# Patient Record
Sex: MALE | Race: WHITE | NOT HISPANIC OR LATINO | Employment: OTHER | URBAN - METROPOLITAN AREA
[De-identification: names, ages, dates, MRNs, and addresses within clinical notes are randomized per-mention and may not be internally consistent; named-entity substitution may affect disease eponyms.]

---

## 2020-10-06 ENCOUNTER — APPOINTMENT (OUTPATIENT)
Dept: PREADMISSION TESTING | Facility: HOSPITAL | Age: 65
End: 2020-10-06
Attending: ORTHOPAEDIC SURGERY
Payer: COMMERCIAL

## 2020-10-06 ENCOUNTER — HOSPITAL ENCOUNTER (OUTPATIENT)
Dept: CARDIOLOGY | Facility: HOSPITAL | Age: 65
Discharge: HOME | End: 2020-10-06
Attending: ORTHOPAEDIC SURGERY
Payer: COMMERCIAL

## 2020-10-06 ENCOUNTER — TRANSCRIBE ORDERS (OUTPATIENT)
Dept: CARDIOLOGY | Facility: HOSPITAL | Age: 65
End: 2020-10-06

## 2020-10-06 VITALS
HEIGHT: 69 IN | WEIGHT: 150.9 LBS | HEART RATE: 72 BPM | BODY MASS INDEX: 22.35 KG/M2 | OXYGEN SATURATION: 100 % | SYSTOLIC BLOOD PRESSURE: 130 MMHG | TEMPERATURE: 98.2 F | DIASTOLIC BLOOD PRESSURE: 74 MMHG | RESPIRATION RATE: 18 BRPM

## 2020-10-06 DIAGNOSIS — M17.12 UNILATERAL PRIMARY OSTEOARTHRITIS, LEFT KNEE: ICD-10-CM

## 2020-10-06 DIAGNOSIS — Z01.818 ENCOUNTER FOR OTHER PREPROCEDURAL EXAMINATION: ICD-10-CM

## 2020-10-06 DIAGNOSIS — M17.12 UNILATERAL PRIMARY OSTEOARTHRITIS, LEFT KNEE: Primary | ICD-10-CM

## 2020-10-06 PROBLEM — M35.3 PMR (POLYMYALGIA RHEUMATICA) (CMS/HCC): Status: ACTIVE | Noted: 2020-10-06

## 2020-10-06 PROBLEM — I82.409 DVT (DEEP VENOUS THROMBOSIS) (CMS/HCC): Status: ACTIVE | Noted: 2020-10-06

## 2020-10-06 PROBLEM — N18.9 CKD (CHRONIC KIDNEY DISEASE): Status: ACTIVE | Noted: 2020-10-06

## 2020-10-06 PROBLEM — Q61.2 POLYCYSTIC KIDNEY, ADULT TYPE: Status: ACTIVE | Noted: 2020-10-06

## 2020-10-06 PROBLEM — E78.5 HLD (HYPERLIPIDEMIA): Status: ACTIVE | Noted: 2020-10-06

## 2020-10-06 PROBLEM — D75.829 HIT (HEPARIN-INDUCED THROMBOCYTOPENIA) (CMS/HCC): Status: ACTIVE | Noted: 2020-10-06

## 2020-10-06 PROBLEM — N25.81 SECONDARY HYPERPARATHYROIDISM OF RENAL ORIGIN (CMS/HCC): Status: ACTIVE | Noted: 2020-10-06

## 2020-10-06 PROBLEM — I10 ESSENTIAL HYPERTENSION: Status: ACTIVE | Noted: 2020-10-06

## 2020-10-06 LAB
ATRIAL RATE: 72
P AXIS: 61
PR INTERVAL: 152
QRS DURATION: 90
QT INTERVAL: 396
QTC CALCULATION(BAZETT): 433
R AXIS: -9
T WAVE AXIS: 42
VENTRICULAR RATE: 72

## 2020-10-06 PROCEDURE — 99214 OFFICE O/P EST MOD 30 MIN: CPT | Performed by: HOSPITALIST

## 2020-10-06 PROCEDURE — 93005 ELECTROCARDIOGRAM TRACING: CPT

## 2020-10-06 RX ORDER — PREDNISONE 5 MG/1
5 TABLET ORAL DAILY PRN
COMMUNITY

## 2020-10-06 SDOH — HEALTH STABILITY: MENTAL HEALTH: HOW OFTEN DO YOU HAVE A DRINK CONTAINING ALCOHOL?: NEVER

## 2020-10-06 ASSESSMENT — PAIN SCALES - GENERAL: PAINLEVEL: 2

## 2020-10-06 NOTE — ASSESSMENT & PLAN NOTE
Will hold both chlorthalidone and lisinopril the AM of surgery  Can resume post op as needed for BP control if renal function is stable  Monitor vitals closely and avoid drops in SBP given risk of progression to ESRD

## 2020-10-06 NOTE — H&P (VIEW-ONLY)
"   [facility] Medicine Service -  Pre-Operative Consultation       Patient Name: David Tapia  Referring Surgeon: Dr knight   Reason for Referral: Pre-Operative Evaluation  Surgical Procedure: total knee arthroplasty  Operative Date: 10/13/2020  Other Providers:      PCP: Rubin Durand MD          HISTORY OF PRESENT ILLNESS      David Tapia is a 65 y.o. male presenting today to the [facility] at [facility] for pre-operative evaluation prior to planned surgery.    Pt reports progressive pain in left knee over the last few years. He tore his ACL and his meniscus in the 1980s. The meniscus was repaired arthroscopically but the ACL was never repaired. He had improvement in pain for several years however over the last 5 years or more he has \"really gone downhill\". He has instability in the left knee and discomfort with activity. He does knee exercises to strengthen the joint which he feels has helped. He also takes tylenol and/or tramadol prn more severe pain.    In regards to medical history:  HTN, HLD, vit d deficiency, PCKD, CKD, hyperparathyroidism, HIT, PMR    The patient denies any current or recent chest pain or pressure, dyspnea, cough, sputum, fevers, chills, abdominal pain, nausea, vomiting, diarrhea or other symptoms.     Functionally, the patient is able to ascend a flight or so of stairs with no dyspnea or chest pain. 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 He routinely carries heavy objects up steps without issues other than  His orthopedic problems with his knee.    The patient denies, on specific questioning, the following:  No history of MI, arrhythmia,or CHF.  No history of PRADIP.  No history of COPD.  No history of CVA.  No history of DM.   Patient denies personal or family history of adverse reaction to anesthesia.    PAST MEDICAL AND SURGICAL HISTORY      Past Medical History:   Diagnosis Date   • Arthritis    • Chronic " kidney disease    • Deep vein thrombosis (CMS/HCC)     right leg   • History of transfusion    • Hypertension    • Lipid disorder    • Lyme disease    • Nocturia    • PE (pulmonary thromboembolism) (CMS/HCC) 2010    B/L lower lobes   • PMR (polymyalgia rheumatica) (CMS/[facility])     sees Dr. Jeronimo-last visit 1 1/2 yrs ago   • Polycystic kidney disease     sees Dr. Sandoval every 4 months   • Secondary hyperparathyroidism of renal origin (CMS/[facility])    • Shingles 2010    torso   • Vitamin D deficiency        Past Surgical History:   Procedure Laterality Date   • COLONOSCOPY     • HERNIA REPAIR Left     inguinal   • OTHER SURGICAL HISTORY      IVC filter   • WISDOM TOOTH EXTRACTION         MEDICATIONS        Current Outpatient Medications:   •  predniSONE (DELTASONE) 5 mg tablet, Take 5 mg by mouth daily as needed. Takes for PMR, Disp: , Rfl:   •  chlorthalidone (HYGROTEN) 25 mg tablet, Take 25 mg by mouth every morning.  , Disp: , Rfl:   •  cholecalciferol, vitamin D3, 50 mcg (2,000 unit) tablet, Take 2,000 Units by mouth every morning.  , Disp: , Rfl:   •  lisinopriL (PRINIVIL) 40 mg tablet, Take 40 mg by mouth every morning.  , Disp: , Rfl:   •  traMADoL (ULTRAM) 50 mg tablet, Take 50 mg by mouth 2 (two) times a day as needed for moderate pain., Disp: , Rfl:     ALLERGIES      Heparin    FAMILY HISTORY      family history includes Lung cancer in his biological father; Polycystic kidney disease in his biological mother.    Denies any prior known family history of DVTs/PEs/clotting disorder    SOCIAL HISTORY      Social History     Tobacco Use   • Smoking status: Former Smoker     Packs/day: 1.00     Years: 20.00     Pack years: 20.00     Types: Cigarettes     Quit date:      Years since quittin.7   • Smokeless tobacco: Never Used   Substance Use Topics   • Alcohol use: Never     Frequency: Never   • Drug use: Never       REVIEW OF SYSTEMS      All other systems reviewed and negative except as noted in  "HPI    PHYSICAL EXAMINATION      Visit Vitals  /74 (BP Location: Left upper arm, Patient Position: Sitting)   Pulse 72   Temp 36.8 °C (98.2 °F) (Temporal)   Resp 18   Ht 1.753 m (5' 9\")   Wt 68.4 kg (150 lb 14.4 oz)   SpO2 100% Comment: RA   BMI 22.28 kg/m²     Body mass index is 22.28 kg/m².    Physical Exam  Vitals signs and nursing note reviewed.   Constitutional:       General: He is not in acute distress.     Comments: thin   HENT:      Head: Normocephalic and atraumatic.   Neck:      Musculoskeletal: Neck supple.   Cardiovascular:      Rate and Rhythm: Normal rate.      Heart sounds: Normal heart sounds.   Pulmonary:      Effort: Pulmonary effort is normal. No respiratory distress.   Abdominal:      General: Bowel sounds are normal.      Palpations: Abdomen is soft.      Tenderness: There is no abdominal tenderness.   Skin:     General: Skin is warm and dry.      Findings: No rash.   Neurological:      Mental Status: He is alert.         LABS / EKG        Labs  outside labs reviewed from 10/2/2020. sig abnormal is creat 3.34    No results found for: NA, K, CL, BUN, CREATININE, WBC, HGB, HCT, PLT, ALT, AST, INR, HGBA1C      ECG/Telemetry  I have independently reviewed the ECG. No significant findings.    ASSESSMENT AND PLAN         CKD (chronic kidney disease)  BUN/Creat 67/3.34 which is near baseline for him (typically 3.1- 3.2)  K and bicarb normal  Pt was cleared by nephrology Dr Tho Sandoval  Would monitor renal function carefully in post operative period and avoid nephrotoxins/ drops in BP  Resume ace inhibitor if renal function stable post op (will be held AM of surgery)    Polycystic kidney, adult type  Stage GIV, A2 according to nephrology progress note from 7/14/2020  Nephrologist is Dr Tho Sandoval in [place]  Stable on regimen of Ace inhibitor and chlorthalidone (both will be held AM of surgery and can be resumed post op)  Kidney transplant and/or HD is on the horizon for him and pt is " aware of risk of progression of CKD with surgery    Note given PCKD pt is at risk for intracranial aneurysms, last MRA in 2008 showed normal [place] of [place], would recommend urgent evaluation if pt were to develop severe or atypical HA      Essential hypertension  Will hold both chlorthalidone and lisinopril the AM of surgery  Can resume post op as needed for BP control if renal function is stable  Monitor vitals closely and avoid drops in SBP given risk of progression to ESRD    HLD (hyperlipidemia)  Has preferred dietary changes to statin medication  Reassess lipids as outpatient    Secondary hyperparathyroidism of renal origin (CMS/[facility])  Continue vitamin D supps    Primary osteoarthritis of left knee  Scheduled for TKA next week    DVT (deep venous thrombosis) (CMS/[facility])  R superficial femoral DVT 6/12/2010 which on follow up US was seen extending into R common femoral vein  IVC filter was placed at that time due to intolerance of blood thinners (had bleeding into one of his renal cysts in June 2010 when DVT was discovered)  Unfortunately presented with PE in July 2010 - was started on argatroban and transitioned to coumadin. HIT testing was positive. Was treated with coumadin for 3-4 months and has been told to avoid heparin in the future.  Pt saw Dr Damico hematology on 10/1 for pre op testing and has follow up appt scheduled 10/9 to review test results (checking for Factor V leiden, prothrombin gene mutation, etc)  Would follow explicit instructions to be given by hematology (Dr Damico) regarding DVT prophylaxis after upcoming appointment.    HIT (heparin-induced thrombocytopenia) (CMS/[facility])  AVOID ALL HEPARIN AND LOVENOX EXPOSURE    PMR (polymyalgia rheumatica) (CMS/[facility])  Sees Dr Dugan rheumatology  Takes low dose prednisone (3-5 mg) prn flares - last use was July 2020 x 1 week  Also uses tramadol prn   Does not need stress dose steroids for upcoming surgery  Outpatient follow up       In regards to  perioperative cardiac risk:  The patient denies any history of ischemic heart disease, denies any history of CHF, denies any history of CVA, is not on pre-operative treatment with insulin, and does not have a pre-operative creatinine > 2 mg/dL.   The Revised Cardiac Risk Index (RCRI) for this patient indicates 0.9% risk.     Further comments: Please follow the explicit instructions to be given by hematology Dr Damico after upcoming appointment scheduled for 10/9/2020 regarding post op DVT prophylaxis in setting of HIT and prior VTE's.  Resume supplements when OK with surgical team.  I would encourage incentive spirometry to assist with minimizing tia-operative pulmonary risk.  DVT prophylaxis and timing of such per the discretion of the surgeon.     Please do not hesitate to contact [facility] during the upcoming hospitalization with any questions or concerns.     Aarti Pino, DO  10/6/2020

## 2020-10-06 NOTE — ASSESSMENT & PLAN NOTE
Sees Dr Dugan rheumatology  Takes low dose prednisone (3-5 mg) prn flares - last use was July 2020 x 1 week  Also uses tramadol prn   Does not need stress dose steroids for upcoming surgery  Outpatient follow up

## 2020-10-06 NOTE — PRE-PROCEDURE INSTRUCTIONS
1. Admissions will call you with your arrival time on 10/12/2020 (day prior to surgery) between 2pm - 4pm. For questions about your arrival time, please call [phone].    2. Please report to the [facility] in the [place] on the day of your procedure. Enter the hospital through the [facility] [place] [facility] (main entrance at [address], [place]). If you are parking in the [facility], come to the ground floor of the garage and follow signs to the [facility]. Bring your insurance card and photo ID.     3. Please follow these fasting guidelines:  - STOP all solid food 8 hours prior to arrival.   - No more than 12oz of water is permitted and must STOP 2 HOURS prior to arrival to the hospital.     4. Early on the morning of the procedure, please take the following medications listed below with a sip of water, in addition to any medications prescribed by your surgeon: Tramadol if needed    *NO aspirin, ibuprofen, anti-inflammatories, fish oil or Vitamin E unless ordered by physician.    *Stop taking      5. Other instructions: Hibiclens shower x 2(no face or genital area), brush teeth    6. If you develop a cough, cold, fever, or other symptom prior to the date of the procedure, please report it to your physician immediately.    7. If you need to cancel the procedure for any reason, please contact your physician.    8. Make arrangements to have someone drive you home from the procedure. If you have not arranged for transportation home, your surgery may be cancelled.     9. You may not take public transportation or or a cab unless accompanied by a responsible person.    10. You may not drive a car or operate complex or potentially dangerous machinery for 24 hours following anesthesia and/or sedation.    11. If it is medically necessary for you to have a longer stay, you will be informed as soon as the decision is made.    12. Do not wear or bring anything of value to the hospital,  including medication or jewelry of any kind. Do not wear make-up or contact lenses. Do bring your glasses and hearing aid, with a case. If you use a CPAP machine and will be overnight, please bring it with you. If you use any inhalers, please bring them as well.     13. Dress in comfortable clothes.    14. If instructed, please bring a copy of your Advance Directive (Living Will/Durable Power of ) on the day of your procedure.    Pre operative instructions given as per protocol.  Form explained by:

## 2020-10-06 NOTE — ASSESSMENT & PLAN NOTE
R superficial femoral DVT 6/12/2010 which on follow up US was seen extending into R common femoral vein  IVC filter was placed at that time due to intolerance of blood thinners (had bleeding into one of his renal cysts in June 2010 when DVT was discovered)  Unfortunately presented with PE in July 2010 - was started on argatroban and transitioned to coumadin. HIT testing was positive. Was treated with coumadin for 3-4 months and has been told to avoid heparin in the future.  Pt saw Dr Damico hematology on 10/1 for pre op testing and has follow up appt scheduled 10/9 to review test results (checking for Factor V leiden, prothrombin gene mutation, etc)  Would follow explicit instructions to be given by hematology (Dr Damico) regarding DVT prophylaxis after upcoming appointment.

## 2020-10-06 NOTE — ASSESSMENT & PLAN NOTE
BUN/Creat 67/3.34 which is near baseline for him (typically 3.1- 3.2)  K and bicarb normal  Pt was cleared by nephrology Dr Tho Sandoval  Would monitor renal function carefully in post operative period and avoid nephrotoxins/ drops in BP  Resume ace inhibitor if renal function stable post op (will be held AM of surgery)

## 2020-10-06 NOTE — ASSESSMENT & PLAN NOTE
Stage GIV, A2 according to nephrology progress note from 7/14/2020  Nephrologist is Dr Tho Sandoval in [place]  Stable on regimen of Ace inhibitor and chlorthalidone (both will be held AM of surgery and can be resumed post op)  Kidney transplant and/or HD is on the horizon for him and pt is aware of risk of progression of CKD with surgery    Note given PCKD pt is at risk for intracranial aneurysms, last MRA in 2008 showed normal [place] of [place], would recommend urgent evaluation if pt were to develop severe or atypical HA

## 2020-10-06 NOTE — CONSULTS
"   [facility] Medicine Service -  Pre-Operative Consultation       Patient Name: Dvaid Tapia  Referring Surgeon: Dr knight   Reason for Referral: Pre-Operative Evaluation  Surgical Procedure: total knee arthroplasty  Operative Date: 10/13/2020  Other Providers:      PCP: Rubin Durand MD          HISTORY OF PRESENT ILLNESS      David Tapia is a 65 y.o. male presenting today to the [facility] at [facility] for pre-operative evaluation prior to planned surgery.    Pt reports progressive pain in left knee over the last few years. He tore his ACL and his meniscus in the 1980s. The meniscus was repaired arthroscopically but the ACL was never repaired. He had improvement in pain for several years however over the last 5 years or more he has \"really gone downhill\". He has instability in the left knee and discomfort with activity. He does knee exercises to strengthen the joint which he feels has helped. He also takes tylenol and/or tramadol prn more severe pain.    In regards to medical history:  HTN, HLD, vit d deficiency, PCKD, CKD, hyperparathyroidism, HIT, PMR    The patient denies any current or recent chest pain or pressure, dyspnea, cough, sputum, fevers, chills, abdominal pain, nausea, vomiting, diarrhea or other symptoms.     Functionally, the patient is able to ascend a flight or so of stairs with no dyspnea or chest pain. 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 He routinely carries heavy objects up steps without issues other than  His orthopedic problems with his knee.    The patient denies, on specific questioning, the following:  No history of MI, arrhythmia,or CHF.  No history of PRADIP.  No history of COPD.  No history of CVA.  No history of DM.   Patient denies personal or family history of adverse reaction to anesthesia.    PAST MEDICAL AND SURGICAL HISTORY      Past Medical History:   Diagnosis Date   • Arthritis    • Chronic " kidney disease    • Deep vein thrombosis (CMS/HCC)     right leg   • History of transfusion    • Hypertension    • Lipid disorder    • Lyme disease    • Nocturia    • PE (pulmonary thromboembolism) (CMS/HCC) 2010    B/L lower lobes   • PMR (polymyalgia rheumatica) (CMS/[facility])     sees Dr. Jeronimo-last visit 1 1/2 yrs ago   • Polycystic kidney disease     sees Dr. Sandoval every 4 months   • Secondary hyperparathyroidism of renal origin (CMS/[facility])    • Shingles 2010    torso   • Vitamin D deficiency        Past Surgical History:   Procedure Laterality Date   • COLONOSCOPY     • HERNIA REPAIR Left     inguinal   • OTHER SURGICAL HISTORY      IVC filter   • WISDOM TOOTH EXTRACTION         MEDICATIONS        Current Outpatient Medications:   •  predniSONE (DELTASONE) 5 mg tablet, Take 5 mg by mouth daily as needed. Takes for PMR, Disp: , Rfl:   •  chlorthalidone (HYGROTEN) 25 mg tablet, Take 25 mg by mouth every morning.  , Disp: , Rfl:   •  cholecalciferol, vitamin D3, 50 mcg (2,000 unit) tablet, Take 2,000 Units by mouth every morning.  , Disp: , Rfl:   •  lisinopriL (PRINIVIL) 40 mg tablet, Take 40 mg by mouth every morning.  , Disp: , Rfl:   •  traMADoL (ULTRAM) 50 mg tablet, Take 50 mg by mouth 2 (two) times a day as needed for moderate pain., Disp: , Rfl:     ALLERGIES      Heparin    FAMILY HISTORY      family history includes Lung cancer in his biological father; Polycystic kidney disease in his biological mother.    Denies any prior known family history of DVTs/PEs/clotting disorder    SOCIAL HISTORY      Social History     Tobacco Use   • Smoking status: Former Smoker     Packs/day: 1.00     Years: 20.00     Pack years: 20.00     Types: Cigarettes     Quit date:      Years since quittin.7   • Smokeless tobacco: Never Used   Substance Use Topics   • Alcohol use: Never     Frequency: Never   • Drug use: Never       REVIEW OF SYSTEMS      All other systems reviewed and negative except as noted in  "HPI    PHYSICAL EXAMINATION      Visit Vitals  /74 (BP Location: Left upper arm, Patient Position: Sitting)   Pulse 72   Temp 36.8 °C (98.2 °F) (Temporal)   Resp 18   Ht 1.753 m (5' 9\")   Wt 68.4 kg (150 lb 14.4 oz)   SpO2 100% Comment: RA   BMI 22.28 kg/m²     Body mass index is 22.28 kg/m².    Physical Exam  Vitals signs and nursing note reviewed.   Constitutional:       General: He is not in acute distress.     Comments: thin   HENT:      Head: Normocephalic and atraumatic.   Neck:      Musculoskeletal: Neck supple.   Cardiovascular:      Rate and Rhythm: Normal rate.      Heart sounds: Normal heart sounds.   Pulmonary:      Effort: Pulmonary effort is normal. No respiratory distress.   Abdominal:      General: Bowel sounds are normal.      Palpations: Abdomen is soft.      Tenderness: There is no abdominal tenderness.   Skin:     General: Skin is warm and dry.      Findings: No rash.   Neurological:      Mental Status: He is alert.         LABS / EKG        Labs  outside labs reviewed from 10/2/2020. sig abnormal is creat 3.34    No results found for: NA, K, CL, BUN, CREATININE, WBC, HGB, HCT, PLT, ALT, AST, INR, HGBA1C      ECG/Telemetry  I have independently reviewed the ECG. No significant findings.    ASSESSMENT AND PLAN         CKD (chronic kidney disease)  BUN/Creat 67/3.34 which is near baseline for him (typically 3.1- 3.2)  K and bicarb normal  Pt was cleared by nephrology Dr Tho Sandoval  Would monitor renal function carefully in post operative period and avoid nephrotoxins/ drops in BP  Resume ace inhibitor if renal function stable post op (will be held AM of surgery)    Polycystic kidney, adult type  Stage GIV, A2 according to nephrology progress note from 7/14/2020  Nephrologist is Dr Tho Sandoval in [place]  Stable on regimen of Ace inhibitor and chlorthalidone (both will be held AM of surgery and can be resumed post op)  Kidney transplant and/or HD is on the horizon for him and pt is " aware of risk of progression of CKD with surgery    Note given PCKD pt is at risk for intracranial aneurysms, last MRA in 2008 showed normal [place] of [place], would recommend urgent evaluation if pt were to develop severe or atypical HA      Essential hypertension  Will hold both chlorthalidone and lisinopril the AM of surgery  Can resume post op as needed for BP control if renal function is stable  Monitor vitals closely and avoid drops in SBP given risk of progression to ESRD    HLD (hyperlipidemia)  Has preferred dietary changes to statin medication  Reassess lipids as outpatient    Secondary hyperparathyroidism of renal origin (CMS/[facility])  Continue vitamin D supps    Primary osteoarthritis of left knee  Scheduled for TKA next week    DVT (deep venous thrombosis) (CMS/[facility])  R superficial femoral DVT 6/12/2010 which on follow up US was seen extending into R common femoral vein  IVC filter was placed at that time due to intolerance of blood thinners (had bleeding into one of his renal cysts in June 2010 when DVT was discovered)  Unfortunately presented with PE in July 2010 - was started on argatroban and transitioned to coumadin. HIT testing was positive. Was treated with coumadin for 3-4 months and has been told to avoid heparin in the future.  Pt saw Dr Damico hematology on 10/1 for pre op testing and has follow up appt scheduled 10/9 to review test results (checking for Factor V leiden, prothrombin gene mutation, etc)  Would follow explicit instructions to be given by hematology (Dr Damico) regarding DVT prophylaxis after upcoming appointment.    HIT (heparin-induced thrombocytopenia) (CMS/[facility])  AVOID ALL HEPARIN AND LOVENOX EXPOSURE    PMR (polymyalgia rheumatica) (CMS/[facility])  Sees Dr Dugan rheumatology  Takes low dose prednisone (3-5 mg) prn flares - last use was July 2020 x 1 week  Also uses tramadol prn   Does not need stress dose steroids for upcoming surgery  Outpatient follow up       In regards to  perioperative cardiac risk:  The patient denies any history of ischemic heart disease, denies any history of CHF, denies any history of CVA, is not on pre-operative treatment with insulin, and does not have a pre-operative creatinine > 2 mg/dL.   The Revised Cardiac Risk Index (RCRI) for this patient indicates 0.9% risk.     Further comments: Please follow the explicit instructions to be given by hematology Dr Damico after upcoming appointment scheduled for 10/9/2020 regarding post op DVT prophylaxis in setting of HIT and prior VTE's.  Resume supplements when OK with surgical team.  I would encourage incentive spirometry to assist with minimizing tia-operative pulmonary risk.  DVT prophylaxis and timing of such per the discretion of the surgeon.     Please do not hesitate to contact [facility] during the upcoming hospitalization with any questions or concerns.     Aarti Pino, DO  10/6/2020

## 2020-10-09 ENCOUNTER — APPOINTMENT (OUTPATIENT)
Dept: PREADMISSION TESTING | Facility: HOSPITAL | Age: 65
End: 2020-10-09
Attending: ORTHOPAEDIC SURGERY
Payer: COMMERCIAL

## 2020-10-09 ENCOUNTER — TRANSCRIBE ORDERS (OUTPATIENT)
Dept: LAB | Facility: HOSPITAL | Age: 65
End: 2020-10-09

## 2020-10-09 DIAGNOSIS — Z11.59 ENCOUNTER FOR SCREENING FOR OTHER VIRAL DISEASES: ICD-10-CM

## 2020-10-09 DIAGNOSIS — Z01.818 ENCOUNTER FOR OTHER PREPROCEDURAL EXAMINATION: ICD-10-CM

## 2020-10-09 DIAGNOSIS — M17.12 UNILATERAL PRIMARY OSTEOARTHRITIS, LEFT KNEE: ICD-10-CM

## 2020-10-09 DIAGNOSIS — M17.12 UNILATERAL PRIMARY OSTEOARTHRITIS, LEFT KNEE: Primary | ICD-10-CM

## 2020-10-09 PROCEDURE — U0003 INFECTIOUS AGENT DETECTION BY NUCLEIC ACID (DNA OR RNA); SEVERE ACUTE RESPIRATORY SYNDROME CORONAVIRUS 2 (SARS-COV-2) (CORONAVIRUS DISEASE [COVID-19]), AMPLIFIED PROBE TECHNIQUE, MAKING USE OF HIGH THROUGHPUT TECHNOLOGIES AS DESCRIBED BY CMS-2020-01-R: HCPCS

## 2020-10-10 LAB — SARS-COV-2 RNA RESP QL NAA+PROBE: NOT DETECTED

## 2020-10-12 ENCOUNTER — ANESTHESIA EVENT (OUTPATIENT)
Dept: OPERATING ROOM | Facility: HOSPITAL | Age: 65
Setting detail: SURGERY ADMIT
DRG: 470 | End: 2020-10-12
Payer: COMMERCIAL

## 2020-10-13 ENCOUNTER — ANESTHESIA (OUTPATIENT)
Dept: OPERATING ROOM | Facility: HOSPITAL | Age: 65
Setting detail: SURGERY ADMIT
DRG: 470 | End: 2020-10-13
Payer: COMMERCIAL

## 2020-10-13 ENCOUNTER — HOSPITAL ENCOUNTER (INPATIENT)
Facility: HOSPITAL | Age: 65
LOS: 1 days | Discharge: HOME | DRG: 470 | End: 2020-10-14
Attending: ORTHOPAEDIC SURGERY | Admitting: ORTHOPAEDIC SURGERY
Payer: COMMERCIAL

## 2020-10-13 ENCOUNTER — APPOINTMENT (OUTPATIENT)
Dept: RADIOLOGY | Facility: HOSPITAL | Age: 65
Setting detail: SURGERY ADMIT
DRG: 470 | End: 2020-10-13
Attending: NURSE PRACTITIONER
Payer: COMMERCIAL

## 2020-10-13 DIAGNOSIS — Z96.652 S/P TOTAL KNEE REPLACEMENT, LEFT: Primary | ICD-10-CM

## 2020-10-13 PROBLEM — Z86.718 HISTORY OF DVT (DEEP VEIN THROMBOSIS): Status: ACTIVE | Noted: 2020-10-06

## 2020-10-13 PROCEDURE — 63600000 HC DRUGS/DETAIL CODE: Performed by: SPECIALIST

## 2020-10-13 PROCEDURE — 27200000 HC STERILE SUPPLY: Performed by: ORTHOPAEDIC SURGERY

## 2020-10-13 PROCEDURE — 63600000 HC DRUGS/DETAIL CODE: Performed by: ORTHOPAEDIC SURGERY

## 2020-10-13 PROCEDURE — 0SRD0JA REPLACEMENT OF LEFT KNEE JOINT WITH SYNTHETIC SUBSTITUTE, UNCEMENTED, OPEN APPROACH: ICD-10-PCS | Performed by: ORTHOPAEDIC SURGERY

## 2020-10-13 PROCEDURE — 37000010 HC ANESTHESIA SPINAL: Performed by: ORTHOPAEDIC SURGERY

## 2020-10-13 PROCEDURE — 97166 OT EVAL MOD COMPLEX 45 MIN: CPT | Mod: GO

## 2020-10-13 PROCEDURE — 97162 PT EVAL MOD COMPLEX 30 MIN: CPT | Mod: GP

## 2020-10-13 PROCEDURE — 25800000 HC PHARMACY IV SOLUTIONS: Performed by: NURSE PRACTITIONER

## 2020-10-13 PROCEDURE — C1776 JOINT DEVICE (IMPLANTABLE): HCPCS | Performed by: ORTHOPAEDIC SURGERY

## 2020-10-13 PROCEDURE — 71000011 HC PACU PHASE 1 EA ADDL MIN: Performed by: ORTHOPAEDIC SURGERY

## 2020-10-13 PROCEDURE — 12000000 HC ROOM AND CARE MED/SURG

## 2020-10-13 PROCEDURE — 63600000 HC DRUGS/DETAIL CODE: Performed by: STUDENT IN AN ORGANIZED HEALTH CARE EDUCATION/TRAINING PROGRAM

## 2020-10-13 PROCEDURE — 73560 X-RAY EXAM OF KNEE 1 OR 2: CPT | Mod: LT

## 2020-10-13 PROCEDURE — 63700000 HC SELF-ADMINISTRABLE DRUG: Performed by: ORTHOPAEDIC SURGERY

## 2020-10-13 PROCEDURE — 63700000 HC SELF-ADMINISTRABLE DRUG: Performed by: STUDENT IN AN ORGANIZED HEALTH CARE EDUCATION/TRAINING PROGRAM

## 2020-10-13 PROCEDURE — 36000016 HC OR LEVEL 6 EA ADDL MIN: Performed by: ORTHOPAEDIC SURGERY

## 2020-10-13 PROCEDURE — 63700000 HC SELF-ADMINISTRABLE DRUG: Performed by: NURSE PRACTITIONER

## 2020-10-13 PROCEDURE — 97535 SELF CARE MNGMENT TRAINING: CPT | Mod: GO

## 2020-10-13 PROCEDURE — 25800000 HC PHARMACY IV SOLUTIONS

## 2020-10-13 PROCEDURE — 63600000 HC DRUGS/DETAIL CODE

## 2020-10-13 PROCEDURE — 99232 SBSQ HOSP IP/OBS MODERATE 35: CPT | Performed by: HOSPITALIST

## 2020-10-13 PROCEDURE — 63600000 HC DRUGS/DETAIL CODE: Performed by: NURSE PRACTITIONER

## 2020-10-13 PROCEDURE — 71000001 HC PACU PHASE 1 INITIAL 30MIN: Performed by: ORTHOPAEDIC SURGERY

## 2020-10-13 PROCEDURE — 25800000 HC PHARMACY IV SOLUTIONS: Performed by: STUDENT IN AN ORGANIZED HEALTH CARE EDUCATION/TRAINING PROGRAM

## 2020-10-13 PROCEDURE — 36000006 HC OR LEVEL 6 INITIAL 30MIN: Performed by: ORTHOPAEDIC SURGERY

## 2020-10-13 PROCEDURE — 25000000 HC PHARMACY GENERAL: Performed by: SPECIALIST

## 2020-10-13 PROCEDURE — 25800000 HC PHARMACY IV SOLUTIONS: Performed by: SPECIALIST

## 2020-10-13 PROCEDURE — 63700000 HC SELF-ADMINISTRABLE DRUG

## 2020-10-13 DEVICE — IMPLANTABLE DEVICE: Type: IMPLANTABLE DEVICE | Site: TIBIA | Status: FUNCTIONAL

## 2020-10-13 DEVICE — PATELLA ASYMMETRIC SZ 38 11MM: Type: IMPLANTABLE DEVICE | Site: PATELLA | Status: FUNCTIONAL

## 2020-10-13 DEVICE — FEMORAL COMP SIZE 6/LEFT: Type: IMPLANTABLE DEVICE | Site: FEMUR | Status: FUNCTIONAL

## 2020-10-13 DEVICE — *T* TIBIAL COMP SZ 6 TRIATHLON TRITANIUM: Type: IMPLANTABLE DEVICE | Site: TIBIA | Status: FUNCTIONAL

## 2020-10-13 RX ORDER — ONDANSETRON HYDROCHLORIDE 2 MG/ML
4 INJECTION, SOLUTION INTRAVENOUS EVERY 8 HOURS PRN
Status: DISCONTINUED | OUTPATIENT
Start: 2020-10-13 | End: 2020-10-14 | Stop reason: HOSPADM

## 2020-10-13 RX ORDER — AMOXICILLIN 250 MG
1 CAPSULE ORAL 2 TIMES DAILY
Status: DISCONTINUED | OUTPATIENT
Start: 2020-10-13 | End: 2020-10-14 | Stop reason: HOSPADM

## 2020-10-13 RX ORDER — SODIUM CHLORIDE 9 MG/ML
INJECTION, SOLUTION INTRAVENOUS CONTINUOUS PRN
Status: DISCONTINUED | OUTPATIENT
Start: 2020-10-13 | End: 2020-10-13 | Stop reason: SURG

## 2020-10-13 RX ORDER — ALUMINUM HYDROXIDE, MAGNESIUM HYDROXIDE, AND SIMETHICONE 1200; 120; 1200 MG/30ML; MG/30ML; MG/30ML
30 SUSPENSION ORAL EVERY 4 HOURS PRN
Status: DISCONTINUED | OUTPATIENT
Start: 2020-10-13 | End: 2020-10-14 | Stop reason: HOSPADM

## 2020-10-13 RX ORDER — HYDROMORPHONE HYDROCHLORIDE 1 MG/ML
0.5 INJECTION, SOLUTION INTRAMUSCULAR; INTRAVENOUS; SUBCUTANEOUS
Status: DISCONTINUED | OUTPATIENT
Start: 2020-10-13 | End: 2020-10-13

## 2020-10-13 RX ORDER — SODIUM CHLORIDE 9 MG/ML
80 INJECTION, SOLUTION INTRAVENOUS CONTINUOUS
Status: DISCONTINUED | OUTPATIENT
Start: 2020-10-13 | End: 2020-10-13

## 2020-10-13 RX ORDER — SODIUM CHLORIDE 9 MG/ML
INJECTION, SOLUTION INTRAVENOUS CONTINUOUS
Status: DISCONTINUED | OUTPATIENT
Start: 2020-10-13 | End: 2020-10-13

## 2020-10-13 RX ORDER — BISACODYL 10 MG/1
10 SUPPOSITORY RECTAL DAILY PRN
Status: DISCONTINUED | OUTPATIENT
Start: 2020-10-13 | End: 2020-10-14 | Stop reason: HOSPADM

## 2020-10-13 RX ORDER — POLYETHYLENE GLYCOL 3350 17 G/17G
17 POWDER, FOR SOLUTION ORAL DAILY
Status: DISCONTINUED | OUTPATIENT
Start: 2020-10-13 | End: 2020-10-14 | Stop reason: HOSPADM

## 2020-10-13 RX ORDER — PROPOFOL 10 MG/ML
INJECTION, EMULSION INTRAVENOUS CONTINUOUS PRN
Status: DISCONTINUED | OUTPATIENT
Start: 2020-10-13 | End: 2020-10-13 | Stop reason: SURG

## 2020-10-13 RX ORDER — DEXTROSE 40 %
15-30 GEL (GRAM) ORAL AS NEEDED
Status: DISCONTINUED | OUTPATIENT
Start: 2020-10-13 | End: 2020-10-14 | Stop reason: HOSPADM

## 2020-10-13 RX ORDER — MIDAZOLAM HYDROCHLORIDE 2 MG/2ML
INJECTION, SOLUTION INTRAMUSCULAR; INTRAVENOUS AS NEEDED
Status: DISCONTINUED | OUTPATIENT
Start: 2020-10-13 | End: 2020-10-13 | Stop reason: SURG

## 2020-10-13 RX ORDER — ACETAMINOPHEN 325 MG/1
650 TABLET ORAL
Status: DISCONTINUED | OUTPATIENT
Start: 2020-10-13 | End: 2020-10-14 | Stop reason: HOSPADM

## 2020-10-13 RX ORDER — SODIUM CHLORIDE 9 MG/ML
INJECTION, SOLUTION INTRAVENOUS CONTINUOUS
Status: DISCONTINUED | OUTPATIENT
Start: 2020-10-14 | End: 2020-10-14

## 2020-10-13 RX ORDER — NAPROXEN SODIUM 220 MG/1
81 TABLET, FILM COATED ORAL 2 TIMES DAILY
Status: DISCONTINUED | OUTPATIENT
Start: 2020-10-13 | End: 2020-10-14 | Stop reason: HOSPADM

## 2020-10-13 RX ORDER — DEXAMETHASONE SODIUM PHOSPHATE 4 MG/ML
8 INJECTION, SOLUTION INTRA-ARTICULAR; INTRALESIONAL; INTRAMUSCULAR; INTRAVENOUS; SOFT TISSUE ONCE
Status: COMPLETED | OUTPATIENT
Start: 2020-10-14 | End: 2020-10-14

## 2020-10-13 RX ORDER — FENTANYL CITRATE 50 UG/ML
INJECTION, SOLUTION INTRAMUSCULAR; INTRAVENOUS AS NEEDED
Status: DISCONTINUED | OUTPATIENT
Start: 2020-10-13 | End: 2020-10-13 | Stop reason: SURG

## 2020-10-13 RX ORDER — DIPHENHYDRAMINE HCL 25 MG
25 CAPSULE ORAL EVERY 6 HOURS PRN
Status: DISCONTINUED | OUTPATIENT
Start: 2020-10-13 | End: 2020-10-14 | Stop reason: HOSPADM

## 2020-10-13 RX ORDER — FENTANYL CITRATE 50 UG/ML
50 INJECTION, SOLUTION INTRAMUSCULAR; INTRAVENOUS
Status: DISCONTINUED | OUTPATIENT
Start: 2020-10-13 | End: 2020-10-13

## 2020-10-13 RX ORDER — SODIUM CHLORIDE, SODIUM GLUCONATE, SODIUM ACETATE, POTASSIUM CHLORIDE AND MAGNESIUM CHLORIDE 30; 37; 368; 526; 502 MG/100ML; MG/100ML; MG/100ML; MG/100ML; MG/100ML
INJECTION, SOLUTION INTRAVENOUS CONTINUOUS PRN
Status: DISCONTINUED | OUTPATIENT
Start: 2020-10-13 | End: 2020-10-13 | Stop reason: SURG

## 2020-10-13 RX ORDER — ROPIVACAINE HYDROCHLORIDE 5 MG/ML
INJECTION, SOLUTION EPIDURAL; INFILTRATION; PERINEURAL AS NEEDED
Status: DISCONTINUED | OUTPATIENT
Start: 2020-10-13 | End: 2020-10-13 | Stop reason: HOSPADM

## 2020-10-13 RX ORDER — HYDROMORPHONE HYDROCHLORIDE 1 MG/ML
0.5 INJECTION, SOLUTION INTRAMUSCULAR; INTRAVENOUS; SUBCUTANEOUS ONCE
Status: COMPLETED | OUTPATIENT
Start: 2020-10-14 | End: 2020-10-14

## 2020-10-13 RX ORDER — ACETAMINOPHEN 325 MG/1
TABLET ORAL
Status: COMPLETED
Start: 2020-10-13 | End: 2020-10-13

## 2020-10-13 RX ORDER — DIPHENHYDRAMINE HCL 50 MG/ML
25 VIAL (ML) INJECTION EVERY 6 HOURS PRN
Status: DISCONTINUED | OUTPATIENT
Start: 2020-10-13 | End: 2020-10-14 | Stop reason: HOSPADM

## 2020-10-13 RX ORDER — OXYCODONE HYDROCHLORIDE 5 MG/1
5-10 TABLET ORAL
Status: DISCONTINUED | OUTPATIENT
Start: 2020-10-14 | End: 2020-10-14

## 2020-10-13 RX ORDER — CEFAZOLIN SODIUM 2 G/50ML
SOLUTION INTRAVENOUS
Status: COMPLETED
Start: 2020-10-13 | End: 2020-10-13

## 2020-10-13 RX ORDER — LISINOPRIL 40 MG/1
40 TABLET ORAL EVERY MORNING
Status: DISCONTINUED | OUTPATIENT
Start: 2020-10-13 | End: 2020-10-14 | Stop reason: HOSPADM

## 2020-10-13 RX ORDER — BUPIVACAINE HYDROCHLORIDE 7.5 MG/ML
INJECTION, SOLUTION INTRASPINAL
Status: COMPLETED | OUTPATIENT
Start: 2020-10-13 | End: 2020-10-13

## 2020-10-13 RX ORDER — CEFAZOLIN SODIUM 2 G/50ML
2 SOLUTION INTRAVENOUS
Status: COMPLETED | OUTPATIENT
Start: 2020-10-13 | End: 2020-10-13

## 2020-10-13 RX ORDER — ONDANSETRON HYDROCHLORIDE 2 MG/ML
4 INJECTION, SOLUTION INTRAVENOUS
Status: DISCONTINUED | OUTPATIENT
Start: 2020-10-13 | End: 2020-10-13

## 2020-10-13 RX ORDER — HYDROMORPHONE HYDROCHLORIDE 1 MG/ML
0.5 INJECTION, SOLUTION INTRAMUSCULAR; INTRAVENOUS; SUBCUTANEOUS ONCE
Status: COMPLETED | OUTPATIENT
Start: 2020-10-13 | End: 2020-10-13

## 2020-10-13 RX ORDER — OXYCODONE HYDROCHLORIDE 5 MG/1
5-10 TABLET ORAL EVERY 4 HOURS PRN
Status: DISCONTINUED | OUTPATIENT
Start: 2020-10-13 | End: 2020-10-13

## 2020-10-13 RX ORDER — IBUPROFEN 200 MG
16-32 TABLET ORAL AS NEEDED
Status: DISCONTINUED | OUTPATIENT
Start: 2020-10-13 | End: 2020-10-14 | Stop reason: HOSPADM

## 2020-10-13 RX ORDER — SODIUM CHLORIDE 9 MG/ML
INJECTION INTRAMUSCULAR; INTRAVENOUS; SUBCUTANEOUS AS NEEDED
Status: DISCONTINUED | OUTPATIENT
Start: 2020-10-13 | End: 2020-10-13 | Stop reason: HOSPADM

## 2020-10-13 RX ORDER — DEXTROSE 50 % IN WATER (D50W) INTRAVENOUS SYRINGE
25 AS NEEDED
Status: DISCONTINUED | OUTPATIENT
Start: 2020-10-13 | End: 2020-10-14 | Stop reason: HOSPADM

## 2020-10-13 RX ORDER — ONDANSETRON 4 MG/1
4 TABLET, ORALLY DISINTEGRATING ORAL EVERY 8 HOURS PRN
Status: DISCONTINUED | OUTPATIENT
Start: 2020-10-13 | End: 2020-10-14 | Stop reason: HOSPADM

## 2020-10-13 RX ORDER — ACETAMINOPHEN 325 MG/1
975 TABLET ORAL
Status: COMPLETED | OUTPATIENT
Start: 2020-10-13 | End: 2020-10-13

## 2020-10-13 RX ADMIN — LISINOPRIL 40 MG: 40 TABLET ORAL at 16:09

## 2020-10-13 RX ADMIN — SODIUM CHLORIDE 2 G: 9 INJECTION, SOLUTION INTRAVENOUS at 18:43

## 2020-10-13 RX ADMIN — OXYCODONE HYDROCHLORIDE 10 MG: 5 TABLET ORAL at 16:09

## 2020-10-13 RX ADMIN — OXYCODONE HYDROCHLORIDE 10 MG: 5 TABLET ORAL at 23:58

## 2020-10-13 RX ADMIN — ACETAMINOPHEN 650 MG: 325 TABLET, FILM COATED ORAL at 16:08

## 2020-10-13 RX ADMIN — MIDAZOLAM HYDROCHLORIDE 2 MG: 1 INJECTION, SOLUTION INTRAMUSCULAR; INTRAVENOUS at 11:28

## 2020-10-13 RX ADMIN — SODIUM CHLORIDE: 900 INJECTION, SOLUTION INTRAVENOUS at 10:53

## 2020-10-13 RX ADMIN — HYDROMORPHONE HYDROCHLORIDE 0.5 MG: 1 INJECTION, SOLUTION INTRAMUSCULAR; INTRAVENOUS; SUBCUTANEOUS at 14:43

## 2020-10-13 RX ADMIN — FENTANYL CITRATE 50 MCG: 50 INJECTION INTRAMUSCULAR; INTRAVENOUS at 13:42

## 2020-10-13 RX ADMIN — ASPIRIN 81 MG: 81 TABLET, CHEWABLE ORAL at 20:11

## 2020-10-13 RX ADMIN — CEFAZOLIN 2 G: 330 INJECTION, POWDER, FOR SOLUTION INTRAMUSCULAR; INTRAVENOUS at 11:28

## 2020-10-13 RX ADMIN — ACETAMINOPHEN 650 MG: 325 TABLET, FILM COATED ORAL at 22:13

## 2020-10-13 RX ADMIN — FENTANYL CITRATE 50 MCG: 50 INJECTION, SOLUTION INTRAMUSCULAR; INTRAVENOUS at 11:42

## 2020-10-13 RX ADMIN — ACETAMINOPHEN 975 MG: 325 TABLET, FILM COATED ORAL at 09:21

## 2020-10-13 RX ADMIN — SODIUM CHLORIDE, SODIUM GLUCONATE, SODIUM ACETATE, POTASSIUM CHLORIDE AND MAGNESIUM CHLORIDE: 526; 502; 368; 37; 30 INJECTION, SOLUTION INTRAVENOUS at 12:44

## 2020-10-13 RX ADMIN — PROPOFOL 40 MCG/KG/MIN: 10 INJECTION, EMULSION INTRAVENOUS at 11:35

## 2020-10-13 RX ADMIN — BUPIVACAINE HYDROCHLORIDE IN DEXTROSE 2 ML: 7.5 INJECTION, SOLUTION SUBARACHNOID at 11:25

## 2020-10-13 RX ADMIN — SODIUM CHLORIDE: 9 INJECTION, SOLUTION INTRAVENOUS at 23:59

## 2020-10-13 RX ADMIN — OXYCODONE HYDROCHLORIDE 10 MG: 5 TABLET ORAL at 20:11

## 2020-10-13 RX ADMIN — ACETAMINOPHEN 975 MG: 325 TABLET ORAL at 09:21

## 2020-10-13 RX ADMIN — POLYETHYLENE GLYCOL 3350 17 G: 17 POWDER, FOR SOLUTION ORAL at 16:08

## 2020-10-13 RX ADMIN — FENTANYL CITRATE 50 MCG: 50 INJECTION INTRAMUSCULAR; INTRAVENOUS at 14:12

## 2020-10-13 RX ADMIN — SODIUM CHLORIDE 80 ML/HR: 9 INJECTION, SOLUTION INTRAVENOUS at 16:09

## 2020-10-13 RX ADMIN — HYDROMORPHONE HYDROCHLORIDE 0.5 MG: 1 INJECTION, SOLUTION INTRAMUSCULAR; INTRAVENOUS; SUBCUTANEOUS at 18:13

## 2020-10-13 RX ADMIN — FENTANYL CITRATE 50 MCG: 50 INJECTION INTRAMUSCULAR; INTRAVENOUS at 13:14

## 2020-10-13 RX ADMIN — VANCOMYCIN HYDROCHLORIDE 1000 MG: 1 INJECTION, POWDER, LYOPHILIZED, FOR SOLUTION INTRAVENOUS at 09:29

## 2020-10-13 RX ADMIN — FENTANYL CITRATE 50 MCG: 50 INJECTION, SOLUTION INTRAMUSCULAR; INTRAVENOUS at 11:28

## 2020-10-13 ASSESSMENT — COGNITIVE AND FUNCTIONAL STATUS - GENERAL
MOVING TO AND FROM BED TO CHAIR: 3 - A LITTLE
DRESSING REGULAR UPPER BODY CLOTHING: 3 - A LITTLE
HELP NEEDED FOR PERSONAL GROOMING: 3 - A LITTLE
STANDING UP FROM CHAIR USING ARMS: 3 - A LITTLE
DRESSING REGULAR LOWER BODY CLOTHING: 3 - A LITTLE
HELP NEEDED FOR BATHING: 3 - A LITTLE
STANDING UP FROM CHAIR USING ARMS: 3 - A LITTLE
EATING MEALS: 4 - NONE
HELP NEEDED FOR BATHING: 2 - A LOT
CLIMB 3 TO 5 STEPS WITH RAILING: 3 - A LITTLE
WALKING IN HOSPITAL ROOM: 3 - A LITTLE
DRESSING REGULAR LOWER BODY CLOTHING: 2 - A LOT
WALKING IN HOSPITAL ROOM: 3 - A LITTLE
MOVING TO AND FROM BED TO CHAIR: 3 - A LITTLE
EATING MEALS: 4 - NONE
DRESSING REGULAR UPPER BODY CLOTHING: 4 - NONE
HELP NEEDED FOR PERSONAL GROOMING: 3 - A LITTLE
TOILETING: 3 - A LITTLE
CLIMB 3 TO 5 STEPS WITH RAILING: 3 - A LITTLE
TOILETING: 2 - A LOT
AFFECT: FLAT/BLUNTED AFFECT;ANXIOUS

## 2020-10-13 ASSESSMENT — PAIN SCALES - GENERAL: PAIN_LEVEL: 0

## 2020-10-13 ASSESSMENT — LIFESTYLE VARIABLES: TOBACCO_USE: 1

## 2020-10-13 NOTE — ANESTHESIA PROCEDURE NOTES
Spinal Block    Patient location during procedure: OR  Start time: 10/13/2020 11:23 AM  End time: 10/13/2020 11:25 AM  Staffing  Anesthesiologist: Janie Rodríguez MD  Performed: anesthesiologist   Reason for block: primary anesthetic  Preanesthetic Checklist  Completed: patient identified, surgical consent, pre-op evaluation, timeout performed, IV checked, risks and benefits discussed, monitors and equipment checked and sterile field maintained during procedure  Spinal Block  Patient position: sitting  Prep: Betadine and site prepped and draped  Patient monitoring: heart rate, cardiac monitor, continuous pulse ox and blood pressure  Approach: midline  Location: L3-4  Injection technique: single-shot  Needle  Needle type: Sprotte   Needle gauge: 24 G  Needle length: 3.5 in  Assessment  Events: cerebrospinal fluid  Additional Notes  Procedure well tolerated. Vital signs stable.    Medications Administered -   Bupivacaine PF (MARCAINE SPINAL) 0.75% intrathecal injection, 2 mL

## 2020-10-13 NOTE — PROGRESS NOTES
Patient: David Tapia  Location: [facility] 5PAV [id number]  MRN: [id number]  Today's date: 10/13/2020    Session ended c patient in bed, alarm on, call bell in reach, tray table aside, immediate needs met, questions answered, and NSG aware of pt position and performance.    David is a 65 y.o. male admitted on 10/13/2020 with S/P total knee arthroplasty, left. Principal problem is S/P total knee replacement, left.    Past Medical History  David has a past medical history of Arthritis, Chronic kidney disease, Deep vein thrombosis (CMS/[facility]) (2010), History of transfusion (2010), Hypertension, Lipid disorder, Lyme disease, Nocturia, PE (pulmonary thromboembolism) (CMS/[facility]) (2010), PMR (polymyalgia rheumatica) (CMS/[facility]), Polycystic kidney disease, Secondary hyperparathyroidism of renal origin (CMS/[facility]), Shingles (2010), and Vitamin D deficiency.    History of Present Illness   s/p L TKA    PT Vitals    Date/Time Pulse HR Source SpO2 Pt Activity O2 Therapy BP BP Location [facility]   10/13/20 1654 88 Monitor 99 % At rest None (Room air) 152/77 Right upper arm ATK      PT Pain    Date/Time Pain Type Pref Pain Scale Side Location Rating: Rest [facility]   10/13/20 1654 Pain Assessment number (Numeric Rating Pain Scale) Left knee 8 ATK          Prior Living Environment      Most Recent Value   Living Environment Comment  Lives with wife in a house with 2 [place]LEXUS. B&B on first floor. Tub shower. No GB no SC.           Prior Level of Function      Most Recent Value   Dominant Hand  right   Ambulation  independent   Transferring  independent   Toileting  independent   Bathing  independent   Dressing  independent   Eating  independent   Prior Level of Function Comment  I PTA no AD. 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 +    Assistive Device/Animal Currently Used at Home  none          PT Evaluation and Treatment - 10/13/20 1644        Time Calculation    Start Time  1644     Stop Time  1654     Time Calculation (min)  10 min         Session Details    Document Type  initial evaluation     Mode of Treatment  physical therapy        General Information    Patient Profile Reviewed?  yes     General Observations of Patient  Pt received in bed     Existing Precautions/Restrictions  fall;weight bearing        Weight-Bearing Status    Left LE Weight-Bearing Status  weight-bearing as tolerated (WBAT)        Cognition/Psychosocial    Comment, Cognition  Grossly AAO        Range of Motion (ROM)    Range of Motion  left lower extremity ROM deficit     Left Lower Extremity (ROM)  knee     Knee, Left (ROM)  14-45        Strength Comprehensive (MMT)    Comprehensive MMT Assessment  lower extremity strength deficit     Comment  L LE deficits s/p L TKA        Bed Mobility    [place], Supine to Sit  minimum assist (75% or more patient effort)     [place], Sit to Supine  minimum assist (75% or more patient effort)     Assistive Device (Bed Mobility)  head of bed elevated;bed rails     Comment (Bed Mobility)  Assist for management of L LE off and on EOB.         Transfers    Comment  Pt deferred transfers 2/2 pain and N/V while sitting EOB.        Balance    Balance Assessment  sitting static balance     Static Sitting Balance  mild impairment     Comment, Balance  Pt able to tolerate sitting EOB for 2-3 minutes before onset of N/V. Pt then requesting return to bed.         AM-PAC (TM) - Mobility (Current Function)    Turning from your back to your side while in a flat bed without using bedrails?  3 - A Little     Moving from lying on your back to sitting on the side of a flat bed without using bedrails?  3 - A Little     Moving to and from a bed to a chair?  3 - A Little     Standing up from a chair using your arms?  3 - A Little     To walk in a hospital room?  3 - A Little     Climbing 3-5 steps with a railing?  3 - A Little     AM-PAC (TM) Mobility Score  18        Therapy Assessment/Plan (PT)    Rehab Potential (PT)  good, to achieve stated therapy  goals     Therapy Frequency (PT)  5-7 times/wk        Progress Summary (PT)    Daily Outcome Statement (PT)  Session was limited 2/2 pain and N/V when sitting EOB. Pt required Parmjit for bed mobility and S for static sitting EOB. Anticipate one N/V and pain is better controlled pt will progress well and be able to d/c home with assist from wife as needed. PT will continue to follow acutely to address deficits and promote safe functional mobility.      Symptoms Noted During/After Treatment  increased pain;other (see comments)    N/V       Therapy Plan Review/Discharge Plan (PT)    PT Recommended Discharge Disposition  home with assist     Anticipated Equipment Needs at Discharge (PT Eval)  walker, front-wheeled        Plan of Care Review    Plan of Care Reviewed With  patient                       Education provided this session. See the Patient Education summary report for full details.    PT Goals      Most Recent Value   Transfer Goal 1   Activity/Assistive Device  sit-to-stand/stand-to-sit, bed-to-chair/chair-to-bed, car transfer at 10/13/2020 1644   [place]  modified independence at 10/13/2020 1644   Time Frame  by discharge at 10/13/2020 1644   Progress/Outcome  goal ongoing at 10/13/2020 1644   Gait Training Goal 1   Activity/Assistive Device  gait (walking locomotion), walker, front-wheeled at 10/13/2020 1644   [place]  modified independence at 10/13/2020 1644   Distance  150 at 10/13/2020 1644   Time Frame  by discharge at 10/13/2020 1644   Progress/Outcome  goal ongoing at 10/13/2020 1644   Stairs Goal 1   Activity/Assistive Device  stairs, all skills, using handrail, left, using handrail, right at 10/13/2020 1644   [place]  modified independence at 10/13/2020 1644   Number of Stairs  2 at 10/13/2020 1644   Time Frame  by discharge at 10/13/2020 1644   Progress/Outcome  goal ongoing at 10/13/2020 1644

## 2020-10-13 NOTE — ASSESSMENT & PLAN NOTE
Seen by hematology preoperatively.  They recommend aspirin for DVT prophylaxis.  Avoid heparin products given history of HIT

## 2020-10-13 NOTE — ANESTHESIA PREPROCEDURE EVALUATION
Relevant Problems   CARDIOVASCULAR   (+) Essential hypertension      HEMATOLOGY   (+) HIT (heparin-induced thrombocytopenia) (CMS/HCC)      MUSCULOSKELETAL   (+) Primary osteoarthritis of left knee       Anesthesia ROS/MED HX    Anesthesia History    Previous anesthetics  Pulmonary    history of tobacco use and ex-smoker  Neuro/Psych - neg  Cardiovascular   hypertension   Covid19 Test Reviewed and ECG reviewed   Normal ECG    GI/Hepatic- neg  Musculoskeletal   Arthritis  Renal Disease   chronic renal insufficiency  Endo/Other- neg  Body Habitus: Normal  ROS/MED HX Comments:    ECG: Few PVCs   Hematological: History of HIT after heparin- had multiple DVTs and PEs - now has an IVC filter   Renal Disease: Polycystic kidney disease and low GFR       Past Surgical History:   Procedure Laterality Date   • COLONOSCOPY     • HERNIA REPAIR Left     inguinal   • OTHER SURGICAL HISTORY  2010    IVC filter   • WISDOM TOOTH EXTRACTION         Physical Exam    Airway   Mallampati: II   TM distance: >3 FB   Neck ROM: full  Cardiovascular - normal   Rhythm: regular   Rate: normalPulmonary - normal   clear to auscultation  Dental - normal        Anesthesia Plan    Plan: spinal    Technique: spinal     Lines and Monitors: PIV     Airway: natural airway / supplemental oxygen       patient did not smoke on day of surgery  ASA 3  Blood Products:   Use of Blood Products Discussed: No   Anesthetic plan and risks discussed with: patient  Induction:    intravenous   Postop Plan:   Patient Disposition: inpatient floor planned admission   Pain Management: IV analgesics and spinal  Comments:    Plan: Discussed spianal and GA with patient- he prefers spinal anesthesia

## 2020-10-13 NOTE — ASSESSMENT & PLAN NOTE
Continue PT/OT  Continue IS  Continue Bowel regimen  pain meds per ortho  DVT prophylaxis- seen by hematology/oncology preoperatively.  Patient with history of DVT and HIT.  To use aspirin as recommended by them.  Avoid heparin products

## 2020-10-13 NOTE — PLAN OF CARE
Problem: Adult Inpatient Plan of Care  Goal: Plan of Care Review  10/13/2020 1918 by Martin Clark, OT  Outcome: Progressing  Flowsheets (Taken 10/13/2020 1918)  Progress: no change  Plan of Care Reviewed With: patient  Outcome Summary: OT Eval completed. Pt limited transfers and ADLs sec fatigue, weakness, nausea and vomiting x1 after achieving supine to sit EOB. Presently Min-Mod A LOC w/ ADLs and functional transfers / mobility - further assessment / OT tx indicated to max functional levels approaching his PLOF

## 2020-10-13 NOTE — ANESTHESIA POSTPROCEDURE EVALUATION
Patient: David Tapia    Procedure Summary     Date: 10/13/20 Room / Location: [facility] PAV OR 01 / [facility] OR PAV    Anesthesia Start: 1116 Anesthesia Stop: 1300    Procedure: KNEE ARTHROPLASTY TOTAL (Left Knee) Diagnosis:       Osteoarthritis of left knee, unspecified osteoarthritis type      (Osteoarthritis)    Surgeon: Yoan Cordova MD Responsible Provider: Janie Rodríguez MD    Anesthesia Type: spinal ASA Status: 3          Anesthesia Type: spinal  PACU Vitals     No data found in the last 10 encounters.      PACU Vitals     No data found in the last 10 encounters.            Anesthesia Post Evaluation    Pain score: 0  Pain management: adequate  Patient location during evaluation: PACU  Patient participation: complete - patient participated  Level of consciousness: awake and alert  Cardiovascular status: acceptable  Airway Patency: adequate  Respiratory status: acceptable  Hydration status: acceptable  Anesthetic complications: no

## 2020-10-13 NOTE — PLAN OF CARE
Plan of Care Review  Plan of Care Reviewed With: patient  Progress: improving  Outcome Summary: pt OOB ax2. pain controlled after 10mg oxycodone, tylenol and 0.5mg dilauded. voiding. no longer nauseous. will continue to monitor.

## 2020-10-13 NOTE — PERIOPERATIVE NURSING NOTE
Patient continues to complain of pain despite falling asleep between doses. Patient stating pain medication is not working. RN did reinforce pain scale and instruct that we are trying to make his pain as tolerable as possible, but we will not take away his pain completely, Patient would like pain level to be 1/10. Did state we will aim for that number but it might not be feasible. Patient verbalized understanding.

## 2020-10-13 NOTE — OP NOTE
Operative Report    Date of procedure: 10/13/2020    Pre-Op Diagnosis: Primary degenerative arthritis left knee    Post-Op Diagnosis: Same    Procedure: Total knee arthroplasty left using a [place] triathlon cementless cruciate retaining total knee prosthesis.  Size 6 left CR cementless femur, a 6 cementless tibia, a 12 CS poly-, 38 mm 3 post asymmetric cementless patella.    Surgeon:  Yoan Cordova MD    Assistant: Olga Grimm PA-C    Anesthesia: Spinal    Estimated Blood Loss:  Minimal    Fluids Replaced: 1000 cc saline    Complications:  None    Specimen: None    Findings: Severe tricompartment arthritic changes with instability    Description of Procedure:    After the induction of anesthesia and appropriate timeout was performed to confirm the side and site of surgery.  The leg was elevated scrubbed with ChloraPrep and draped in the usual manner.  The limb was exsanguinated with an Esmarch bandage and the tourniquet inflated to 275 mmHg.  A vertical anteromedial incision was created extending from the medial aspect of the superior pole the patella to the medial aspect of the tibial tubercle.  It was taken down through the subcutaneous tissue.  Bleeding was controlled with electrocautery.  A mid vastus arthrotomy was performed medially and the retropatellar fat pad was excised.  The proximal medial tibia was exposed in a subperiosteal manner.  Attention was then directed to the patella which was sized and prepared in the usual manner.  The anterior cruciate ligament was excised with care being taken to preserve the posterior cruciate ligament.  The tibia was dislocated anteriorly.  The remnants of the medial and lateral menisci were debrided.  An extra medullary alignment guide was used to resect the articular surface of the tibia perpendicular to the long axis of the tibia.  Osteophytes were debrided.  Sizing was then performed and the tibia was prepared.  Attention was then directed to the femur where an  intramedullary alignment guide was used to resect the distal femur.  The anterior posterior sizing guide was used to select the appropriate  4-in-1 cutting guide which was then used to resect the remaining portions of the distal femur.  The flexion and extension gaps were assessed and found to be balanced and symmetrical in both flexion and extension.  The soft tissue was infiltrated half percent ropivacaine.  Trial reduction was then performed using the above-mentioned sizes.  This gave excellent alignment, good stability in flexion and extension, and excellent tracking of the patella.    The trial components were then removed.  The wound was thoroughly irrigated with saline solution using  power lavage.  The permanent cementless components were then impacted into position.  The knee was then articulated and found to be stable in flexion and extension with excellent patellar tracking and full range of motion.    After thorough irrigation the arthrotomy was closed with a running #2 Quill.  The subcutaneous tissue was closed with interrupted 2-0 Vicryl.  The skin was closed with 3-0 Monocryl in a subcuticular manner compression dressings applied and the tourniquet was deflated.    Olga Grimm PA-C, first assisted in the operation as no resident was available for the case.  She helped position the patient, first assisted throughout the surgery, aided in closing, and returning the patient to the postanesthesia care unit.    Patient tolerated procedure well in the operating room in satisfactory condition.  I was present and scrubbed through all the major components of the operation.    Yoan Cordova MD

## 2020-10-13 NOTE — CONSULTS
"   Hospital Medicine Service -  Daily Progress Note       SUBJECTIVE   Interval History: Patient seen in PACU.  Was sleeping but arousable to voice.  Upon waking, complains of pain in his knee.  Denies any chest pains, shortness of breath, nausea, vomiting, lightheadedness, or dizziness.     OBJECTIVE      Vital signs in last 24 hours:  Temp:  [36.4 °C (97.6 °F)-36.6 °C (97.8 °F)] 36.6 °C (97.8 °F)  Heart Rate:  [66-87] 87  Resp:  [11-19] 19  BP: (126-150)/(78-93) 145/85    Intake/Output Summary (Last 24 hours) at 10/13/2020 1446  Last data filed at 10/13/2020 1244  Gross per 24 hour   Intake 1000 ml   Output --   Net 1000 ml       PHYSICAL EXAMINATION      Physical Exam   Visit Vitals  BP (!) 145/85   Pulse 87   Temp 36.6 °C (97.8 °F) (Temporal)   Resp 19   Ht 1.753 m (5' 9\")   Wt 66.4 kg (146 lb 5 oz)   SpO2 (P) 99%   BMI 21.61 kg/m²       Physical Exam  General Appearance:        Awakens to voice   Head:    Normocephalic   Eyes:    No icterus   Respiratory:     Clear to auscultation bilaterally   Cardiovascular:    Regular rate and rhythm   GI:     Soft, non-tender, bowel sounds active    Psychiatric   cooperative        LINES, CATHETERS, DRAINS, AIRWAYS, AND WOUNDS   Lines, Drains, Airways, Wounds:  Peripheral IV 10/13/20 Anterior;Left;Proximal Forearm (Active)   Number of days: 0       Surgical Incision Knee Left (Active)   Number of days: 0       Comments:      LABS / IMAGING / TELE      Labs  Baseline creatinine reported to be around 3.1    ASSESSMENT AND PLAN      S/P total knee replacement, left  Assessment & Plan  Continue PT/OT  Encourage IS  Continue Bowel regimen  pain meds per ortho  DVT prophylaxis- seen by hematology/oncology preoperatively.  Patient with history of DVT and HIT.  To use aspirin as recommended by them.  Avoid heparin products      PMR (polymyalgia rheumatica) (CMS/[facility])  Assessment & Plan  Follow-up with rheumatology  Does not use prednisone daily-uses as needed for flares.      HIT " (heparin-induced thrombocytopenia) (CMS/[facility])  Assessment & Plan  Avoid heparin products    History of DVT (deep vein thrombosis)  Assessment & Plan  Seen by hematology preoperatively.  They recommend aspirin for DVT prophylaxis.  Avoid heparin products given history of HIT    HLD (hyperlipidemia)  Assessment & Plan   not on statin.  Follow-up as outpatient    Essential hypertension  Assessment & Plan  Continue ACE inhibitor  Check BMP in the morning  Hold diuretic    CKD (chronic kidney disease)  Assessment & Plan  The patient has polycystic kidneys and chronic kidney disease.  Baseline creatinine is a little above 3.  Follows with nephrology as an outpatient.  Avoid nephrotoxins.  Check BMP in the morning.  Hold diuretic for now    Polycystic kidney, adult type  Assessment & Plan  The patient has polycystic kidneys and chronic kidney disease.  Follows with nephrology as an outpatient.  Avoid nephrotoxins.  Check BMP in the morning.  Hold diuretic for now            Shantanu Herron, DO  10/13/2020

## 2020-10-13 NOTE — PLAN OF CARE
Problem: Adult Inpatient Plan of Care  Goal: Plan of Care Review  Outcome: Progressing  Flowsheets (Taken 10/13/2020 8350)  Plan of Care Reviewed With: patient  Outcome Summary: PT eval complete- limited 2/2 pain and N/V when sitting EOB.

## 2020-10-13 NOTE — ASSESSMENT & PLAN NOTE
The patient has polycystic kidneys and chronic kidney disease.  Follows with nephrology as an outpatient.  Avoid nephrotoxins.  Hold diuretic for now

## 2020-10-13 NOTE — HOSPITAL COURSE
David is a 65 y.o. male admitted on 10/13/2020 with S/P total knee arthroplasty, left. Principal problem is S/P total knee replacement, left.    Past Medical History  David has a past medical history of Arthritis, Chronic kidney disease, Deep vein thrombosis (CMS/[facility]) (2010), History of transfusion (2010), Hypertension, Lipid disorder, Lyme disease, Nocturia, PE (pulmonary thromboembolism) (CMS/HCC) (2010), PMR (polymyalgia rheumatica) (CMS/[facility]), Polycystic kidney disease, Secondary hyperparathyroidism of renal origin (CMS/[facility]), Shingles (2010), and Vitamin D deficiency.    History of Present Illness   s/p L TKA  WBAT

## 2020-10-13 NOTE — PROGRESS NOTES
Patient: David Tapia  Location: [facility] 5PAV 5434  MRN: [id number]  Today's date: 10/13/2020  Falls alarm set  David is a 65 y.o. male admitted on 10/13/2020 with S/P total knee arthroplasty, left. Principal problem is S/P total knee replacement, left.    Past Medical History  David has a past medical history of Arthritis, Chronic kidney disease, Deep vein thrombosis (CMS/[facility]) (2010), History of transfusion (2010), Hypertension, Lipid disorder, Lyme disease, Nocturia, PE (pulmonary thromboembolism) (CMS/[facility]) (2010), PMR (polymyalgia rheumatica) (CMS/[facility]), Polycystic kidney disease, Secondary hyperparathyroidism of renal origin (CMS/[facility]), Shingles (2010), and Vitamin D deficiency.    History of Present Illness   s/p L TKA    OT Vitals    Date/Time Pulse HR Source SpO2 Pt Activity O2 Therapy BP BP Location BP Method Pt Position Observations [facility]   10/13/20 1632 88 -- 98 % At rest -- 152/77 -- -- -- -- SRD   10/13/20 1654 88 Monitor 99 % At rest None (Room air) 152/77 Right upper arm -- -- -- ATK   10/13/20 1700 -- -- -- -- -- 124/73 Right upper arm Automatic Lying right after attempting to sit at EOB. N/V while sitting ATK      OT Pain    Date/Time Pain Type Pref Pain Scale Side Location Rating: Rest [facility]   10/13/20 1632 Pain Assessment number (Numeric Rating Pain Scale) -- knee 8 SRD   10/13/20 1654 Pain Assessment number (Numeric Rating Pain Scale) Left knee 8 ATK          Prior Living Environment      Most Recent Value   Living Environment Comment  Lives with wife in a house with 2 ALEXUS. B&B on first floor. Tub shower. No GB no SC.           Prior Level of Function      Most Recent Value   Dominant Hand  right   Ambulation  independent   Transferring  independent   Toileting  independent   Bathing  independent   Dressing  independent   Eating  independent   Prior Level of Function Comment  I PTA no AD. 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 +    Assistive Device/Animal Currently Used at Home   none          Occupational Profile      Most Recent Value   Reason for Services/Referral  Pt w/ decreased ADLS, functional transfers, functional mobility          OT Evaluation and Treatment - 10/13/20 1632        Time Calculation    Start Time  1632     Stop Time  1704     Time Calculation (min)  32 min        Session Details    Document Type  initial evaluation     Mode of Treatment  occupational therapy        General Information    Patient Profile Reviewed?  yes     General Observations of Patient  Pt received supine in bed     Existing Precautions/Restrictions  fall;weight bearing    fall, weight bearing       Weight-Bearing Status    Left LE Weight-Bearing Status  weight-bearing as tolerated (WBAT)        Cognition/Psychosocial    Affect/Mental Status (Cognitive)  flat/blunted affect;anxious    some nausea - decreased attention sec to       Vision Assessment/Intervention    Visual Impairment/Limitations  corrective lenses for reading        Range of Motion (ROM)    Range of Motion  ROM is WFL;bilateral upper extremities    grossly assessed B UEs       Strength Comprehensive (MMT)    Comprehensive MMT Assessment  no strength deficits identified    B UEs grossly assessed       Bed Mobility    [place], Supine to Sit  minimum assist (75% or more patient effort)     [place], Sit to Supine  minimum assist (75% or more patient effort)     Assistive Device (Bed Mobility)  head of bed elevated;bed rails     Comment (Bed Mobility)  A x1 for leg positioning and vcs/light tactile cues        Transfers    Comment  further fxal transfers defeered sec pt became nauseous and vomited x1 - requested to lie down again        Balance    Balance Assessment  sitting dynamic balance     Static Sitting Balance  moderate impairment     Comment, Balance  Pt w/ increased pain, N and V. fatigue/generalized and LE weakness limiting        Lower Body Dressing    [place]  dons/doffs left sock;dons/doffs right sock      [place]  1 person assist;moderate assist (50-74% patient effort)     Comment  pt significantly limited by fatigue, LE pain s/p procedure and weakness, nausea        AM-PAC (TM) - ADL (Current Function)    Putting on and taking off regular lower body clothing?  2 - A Lot     Bathing?  2 - A Lot     Toileting?  2 - A Lot     Putting on/taking off regular upper body clothing?  3 - A Little     How much help for taking care of personal grooming?  3 - A Little     Eating meals?  4 - None     AM-PAC (TM) ADL Score  16        Therapy Assessment/Plan (OT)    Rehab Potential (OT)  good, to achieve stated therapy goals     Therapy Frequency (OT)  5-7 times/wk     Criteria for Skilled Therapeutic Interventions Met (OT)  yes        Progress Summary (OT)    Daily Outcome Statement (OT)  66 y/o m. admit w/ TKR now Min-Mod A LOC to perform his ADLs and functional transfers / mobility w/ use of RW, AE,DME as appropriate. Plans to go home from acute hospitalization w/ A - to OP therapy.      Symptoms Noted During/After Treatment  --    Pt w/ increased pain, nauea and x1 vomiting, generally weak    Impairments Continuing to Limit Function  decreased ROM;decreased strength;impaired balance;impaired coordination;impaired safety awareness;pain    nausea and vomiting       Therapy Plan Review/Discharge Plan (OT)    OT Recommended Discharge Disposition  home with assist;home with outpatient services     Anticipated Equipment Needs At Discharge (OT)  bathing equipment;toileting equipment;commode, 3-in-1;shower chair;reacher;raised toilet seat;dressing equipment;walker, front-wheeled                   Education provided this session. See the Patient Education summary report for full details.         OT Goals      Most Recent Value   Bed Mobility Goal 1   Activity/Assistive Device  bed mobility activities, all at 10/13/2020 1632   [place]  independent at 10/13/2020 1632   Time Frame  2 - 3 days at 10/13/2020 1632   Progress/Outcome   goal ongoing at 10/13/2020 1632   Transfer Goal 1   Activity/Assistive Device  all transfers at 10/13/2020 1632   [place]  modified independence at 10/13/2020 1632   Time Frame  2 - 3 days at 10/13/2020 1632   Progress/Outcome  goal ongoing at 10/13/2020 1632   Dressing Goal 1   Activity/Adaptive Equipment  dressing skills, all at 10/13/2020 1632   [place]  modified independence at 10/13/2020 1632   Time Frame  2 - 3 days at 10/13/2020 1632   Progress/Outcome  goal ongoing at 10/13/2020 1632

## 2020-10-13 NOTE — PROGRESS NOTES
Orthopedic Post Surgical Note    64 y/o male s/p left total knee arthroplasty    Physical Exam:  - dressing clean, dry, intact with mepilex to left knee  - sensation intact to light touch  - palpable DP, PT pulses  - active dorsiflexion, plantarflexion, extensor hallucis longus    A/P:   - pain control  - physical therapy/occupational therapy  - DVT prophylaxis  .

## 2020-10-13 NOTE — ASSESSMENT & PLAN NOTE
The patient has polycystic kidneys and chronic kidney disease.  Baseline creatinine is a little above 3.  Follows with nephrology as an outpatient.  Avoid nephrotoxins.  Hold diuretic for now  Creat a little higher today. Encouraged po fluids and keep iv fluids while here  Repeat bmp in am if staying.  If leaving today, repeat bmp within couple days as outpt and f/u with nephrologist.

## 2020-10-13 NOTE — ANESTHESIOLOGIST PRE-PROCEDURE ATTESTATION
Pre-Procedure Patient Identification:  I am the Primary Anesthesiologist and have identified the patient on 10/13/20 at 10:41 AM.   I have confirmed the following procedure(s) KNEE ARTHROPLASTY TOTAL (L) will be performed by the following surgeon/proceduralist Yoan Cordova MD.

## 2020-10-14 VITALS
DIASTOLIC BLOOD PRESSURE: 87 MMHG | OXYGEN SATURATION: 97 % | HEART RATE: 95 BPM | RESPIRATION RATE: 17 BRPM | BODY MASS INDEX: 24.82 KG/M2 | TEMPERATURE: 97.9 F | SYSTOLIC BLOOD PRESSURE: 134 MMHG | HEIGHT: 69 IN | WEIGHT: 167.55 LBS

## 2020-10-14 LAB
ANION GAP SERPL CALC-SCNC: 12 MEQ/L (ref 3–15)
BUN SERPL-MCNC: 71 MG/DL (ref 8–20)
CALCIUM SERPL-MCNC: 8.2 MG/DL (ref 8.9–10.3)
CHLORIDE SERPL-SCNC: 110 MEQ/L (ref 98–109)
CO2 SERPL-SCNC: 18 MEQ/L (ref 22–32)
CREAT SERPL-MCNC: 3.6 MG/DL (ref 0.8–1.3)
ERYTHROCYTE [DISTWIDTH] IN BLOOD BY AUTOMATED COUNT: 12.8 % (ref 11.6–14.4)
GFR SERPL CREATININE-BSD FRML MDRD: 17 ML/MIN/1.73M*2
GLUCOSE SERPL-MCNC: 132 MG/DL (ref 70–99)
HCT VFR BLDCO AUTO: 35.7 % (ref 40.1–51)
HGB BLD-MCNC: 12 G/DL (ref 13.7–17.5)
MCH RBC QN AUTO: 30.5 PG (ref 28–33.2)
MCHC RBC AUTO-ENTMCNC: 33.6 G/DL (ref 32.2–36.5)
MCV RBC AUTO: 90.8 FL (ref 83–98)
PDW BLD AUTO: 9.6 FL (ref 9.4–12.4)
PLATELET # BLD AUTO: 172 K/UL (ref 150–350)
POTASSIUM SERPL-SCNC: 4.2 MEQ/L (ref 3.6–5.1)
RBC # BLD AUTO: 3.93 M/UL (ref 4.5–5.8)
SODIUM SERPL-SCNC: 140 MEQ/L (ref 136–144)
WBC # BLD AUTO: 11.2 K/UL (ref 3.8–10.5)

## 2020-10-14 PROCEDURE — 63600000 HC DRUGS/DETAIL CODE: Performed by: STUDENT IN AN ORGANIZED HEALTH CARE EDUCATION/TRAINING PROGRAM

## 2020-10-14 PROCEDURE — 97150 GROUP THERAPEUTIC PROCEDURES: CPT | Mod: GO

## 2020-10-14 PROCEDURE — 63700000 HC SELF-ADMINISTRABLE DRUG: Performed by: STUDENT IN AN ORGANIZED HEALTH CARE EDUCATION/TRAINING PROGRAM

## 2020-10-14 PROCEDURE — 99232 SBSQ HOSP IP/OBS MODERATE 35: CPT | Performed by: HOSPITALIST

## 2020-10-14 PROCEDURE — 25800000 HC PHARMACY IV SOLUTIONS: Performed by: NURSE PRACTITIONER

## 2020-10-14 PROCEDURE — 80048 BASIC METABOLIC PNL TOTAL CA: CPT | Performed by: NURSE PRACTITIONER

## 2020-10-14 PROCEDURE — 85027 COMPLETE CBC AUTOMATED: CPT | Performed by: NURSE PRACTITIONER

## 2020-10-14 PROCEDURE — 97150 GROUP THERAPEUTIC PROCEDURES: CPT | Mod: GP,CQ

## 2020-10-14 PROCEDURE — 25800000 HC PHARMACY IV SOLUTIONS: Performed by: HOSPITALIST

## 2020-10-14 PROCEDURE — 63700000 HC SELF-ADMINISTRABLE DRUG: Performed by: NURSE PRACTITIONER

## 2020-10-14 PROCEDURE — 63600000 HC DRUGS/DETAIL CODE: Performed by: NURSE PRACTITIONER

## 2020-10-14 PROCEDURE — 97535 SELF CARE MNGMENT TRAINING: CPT | Mod: GO

## 2020-10-14 PROCEDURE — 36415 COLL VENOUS BLD VENIPUNCTURE: CPT | Performed by: NURSE PRACTITIONER

## 2020-10-14 PROCEDURE — 97116 GAIT TRAINING THERAPY: CPT | Mod: GP,CQ

## 2020-10-14 RX ORDER — HYDROMORPHONE HYDROCHLORIDE 2 MG/1
2-4 TABLET ORAL EVERY 4 HOURS PRN
Status: DISCONTINUED | OUTPATIENT
Start: 2020-10-14 | End: 2020-10-14 | Stop reason: HOSPADM

## 2020-10-14 RX ORDER — AMOXICILLIN 250 MG
1 CAPSULE ORAL 2 TIMES DAILY
Qty: 60 TABLET | Refills: 0 | Status: SHIPPED | OUTPATIENT
Start: 2020-10-14 | End: 2020-12-08

## 2020-10-14 RX ORDER — HYDROMORPHONE HYDROCHLORIDE 2 MG/1
2-4 TABLET ORAL EVERY 4 HOURS PRN
Qty: 30 TABLET | Refills: 0 | Status: SHIPPED | OUTPATIENT
Start: 2020-10-14 | End: 2020-10-19

## 2020-10-14 RX ORDER — ACETAMINOPHEN 325 MG/1
650 TABLET ORAL EVERY 6 HOURS PRN
Start: 2020-10-14 | End: 2020-11-13

## 2020-10-14 RX ORDER — NAPROXEN SODIUM 220 MG/1
81 TABLET, FILM COATED ORAL 2 TIMES DAILY
Qty: 56 TABLET | Refills: 0 | Status: SHIPPED | OUTPATIENT
Start: 2020-10-14 | End: 2020-12-04 | Stop reason: ALTCHOICE

## 2020-10-14 RX ORDER — SODIUM CHLORIDE 9 MG/ML
INJECTION, SOLUTION INTRAVENOUS CONTINUOUS
Status: DISCONTINUED | OUTPATIENT
Start: 2020-10-14 | End: 2020-10-14 | Stop reason: HOSPADM

## 2020-10-14 RX ORDER — CHLORTHALIDONE 25 MG/1
TABLET ORAL
Start: 2020-10-14

## 2020-10-14 RX ORDER — POLYETHYLENE GLYCOL 3350 17 G/17G
17 POWDER, FOR SOLUTION ORAL DAILY PRN
Refills: 0
Start: 2020-10-14 | End: 2020-11-13

## 2020-10-14 RX ADMIN — ASPIRIN 81 MG: 81 TABLET, CHEWABLE ORAL at 08:56

## 2020-10-14 RX ADMIN — ONDANSETRON 4 MG: 2 INJECTION INTRAMUSCULAR; INTRAVENOUS at 04:44

## 2020-10-14 RX ADMIN — DEXAMETHASONE SODIUM PHOSPHATE 8 MG: 4 INJECTION, SOLUTION INTRAMUSCULAR; INTRAVENOUS at 06:01

## 2020-10-14 RX ADMIN — ACETAMINOPHEN 650 MG: 325 TABLET, FILM COATED ORAL at 04:45

## 2020-10-14 RX ADMIN — HYDROMORPHONE HYDROCHLORIDE 2 MG: 2 TABLET ORAL at 10:35

## 2020-10-14 RX ADMIN — ACETAMINOPHEN 650 MG: 325 TABLET, FILM COATED ORAL at 10:35

## 2020-10-14 RX ADMIN — HYDROMORPHONE HYDROCHLORIDE 2 MG: 2 TABLET ORAL at 14:28

## 2020-10-14 RX ADMIN — OXYCODONE HYDROCHLORIDE 10 MG: 5 TABLET ORAL at 06:01

## 2020-10-14 RX ADMIN — SODIUM CHLORIDE: 9 INJECTION, SOLUTION INTRAVENOUS at 12:38

## 2020-10-14 RX ADMIN — LISINOPRIL 40 MG: 40 TABLET ORAL at 08:56

## 2020-10-14 RX ADMIN — OXYCODONE HYDROCHLORIDE 10 MG: 5 TABLET ORAL at 02:55

## 2020-10-14 RX ADMIN — SODIUM CHLORIDE 2 G: 9 INJECTION, SOLUTION INTRAVENOUS at 02:55

## 2020-10-14 RX ADMIN — DOCUSATE SODIUM AND SENNOSIDES 1 TABLET: 8.6; 5 TABLET, FILM COATED ORAL at 08:56

## 2020-10-14 RX ADMIN — POLYETHYLENE GLYCOL 3350 17 G: 17 POWDER, FOR SOLUTION ORAL at 08:56

## 2020-10-14 RX ADMIN — HYDROMORPHONE HYDROCHLORIDE 0.5 MG: 1 INJECTION, SOLUTION INTRAMUSCULAR; INTRAVENOUS; SUBCUTANEOUS at 00:55

## 2020-10-14 ASSESSMENT — COGNITIVE AND FUNCTIONAL STATUS - GENERAL
MOVING TO AND FROM BED TO CHAIR: 4 - NONE
WALKING IN HOSPITAL ROOM: 4 - NONE
CLIMB 3 TO 5 STEPS WITH RAILING: 3 - A LITTLE
TOILETING: 3 - A LITTLE
DRESSING REGULAR LOWER BODY CLOTHING: 3 - A LITTLE
HELP NEEDED FOR PERSONAL GROOMING: 3 - A LITTLE
AFFECT: WFL
HELP NEEDED FOR BATHING: 3 - A LITTLE
EATING MEALS: 4 - NONE
STANDING UP FROM CHAIR USING ARMS: 4 - NONE
AFFECT: WFL
DRESSING REGULAR UPPER BODY CLOTHING: 4 - NONE

## 2020-10-14 NOTE — PLAN OF CARE
Problem: Adult Inpatient Plan of Care  Goal: Readiness for Transition of Care  Outcome: Progressing     Problem: Adult Inpatient Plan of Care  Goal: Readiness for Transition of Care  Intervention: Mutually Develop Transition Plan  Flowsheets (Taken 10/14/2020 1143)  Anticipated Discharge Disposition: home with outpatient services  Equipment Needed After Discharge: walker, front-wheeled  Assistive Device/Animal Currently Used at Home: none  Concerns Comments: Met with patient at bedside. Sitting OOB in chair. Independent with adls prior to admission. Plans to return home with assist of wife. Plans to attend out patient physical therapy. Will decide on site and make appointment upon return home.  Transportation Concerns: car, none  Current Discharge Risk: dependent with mobility/activities of daily living  Readmission Within the Last 30 Days: no previous admission in last 30 days  Patient/Family Anticipated Services at Transition: outpatient care  Patient/Family Anticipates Transition to: home with family  Transportation Anticipated: (Wife will transport home when discharged.) family or friend will provide  Concerns to be Addressed: discharge planning

## 2020-10-14 NOTE — NURSING NOTE
Dr. Lunsford up to re-assess patient. Oxycodone frequency increased to q 3 hours and a one time dose of IV dilaudid available if patient has no relief. Fluids decreased to 40cc/hour due to patient's kidney history. Will continue to monitor.

## 2020-10-14 NOTE — NURSING NOTE
Patient still complaining that oxycodone is not working long enough. Administered one time dose of IV diluadid for breakthrough pain. Patient states he expected a little pain but not this much. Frequently mentions dilaudid and asks if that would last longer than oxycodone. Stressed to patient that after knee surgery there will be pain for a few weeks and our goal is to make it bareable. Dr. Lunsford addressed patients concerns earlier in shift, patient needs further reiteration.     Patient seen sleeping on pain reassessment after dilaudid. Will continue to monitor.

## 2020-10-14 NOTE — PLAN OF CARE
Problem: Adult Inpatient Plan of Care  Goal: Plan of Care Review  Outcome: Progressing  Flowsheets (Taken 10/14/2020 1727)  Progress: improving  Plan of Care Reviewed With: patient  Outcome Summary: Training conducted with negotiation of curb, car transfers, gait w/ RW and transfer techniques. Pt. demo good carryover with instructions. Also educated in application of surgical precautions with daily activities/mobility. Pt's family not present for treatment. Pt educated on how family can provide assistance / supervision for safety. Pt cleared PT gym

## 2020-10-14 NOTE — PLAN OF CARE
Plan of Care Review  Plan of Care Reviewed With: patient  Progress: no change  Outcome Summary: Patient refused to get OOB during shift due to pain and fear of nausea. Patient continues to c/o 8/10 pain despite increased frequency of PRN oxycodone and two doses of IV dilaudid. Ortho MD aware, patient appeared to be sleeping after pain medication. Dressing remains C/D/I. Patient will have PT @ 11:30.

## 2020-10-14 NOTE — NURSING NOTE
Received phone call from wife of patient stating that her  is in uncontrollable pain. RN was in room recently to administered ATC Tylenol and patient had stated it was a 6/10. Oxycodone administered as soon as it possibly could. On assessment, knee is swollen but pulses are palpable and patient denies any numbness or tingling. Ice being used to help w/ swelling. Wife would like phone call from orthopedic MD. Paged Dr. Lunsford who is in ED currently but will be up to assess patient as soon as possible.

## 2020-10-14 NOTE — PROGRESS NOTES
"   Hospital Medicine Service -  Daily Progress Note       SUBJECTIVE   Interval History: Had pain in his knee last night.  Did not eat much for dinner last evening.  Plans on eating good breakfast this morning.  Denies chest pains or shortness of breath.  No nausea or vomiting.  Denies lightheadedness or dizziness     OBJECTIVE      Vital signs in last 24 hours:  Temp:  [36.2 °C (97.1 °F)-37 °C (98.6 °F)] 36.9 °C (98.4 °F)  Heart Rate:  [] 98  Resp:  [9-19] 18  BP: (123-177)/(73-97) 123/75    Intake/Output Summary (Last 24 hours) at 10/14/2020 1007  Last data filed at 10/13/2020 2253  Gross per 24 hour   Intake 1000 ml   Output 1350 ml   Net -350 ml       PHYSICAL EXAMINATION      Physical Exam   Visit Vitals  /75 (BP Location: Right upper arm, Patient Position: Lying)   Pulse 98   Temp 36.9 °C (98.4 °F) (Oral)   Resp 18   Ht 1.753 m (5' 9\")   Wt 76 kg (167 lb 8.8 oz)   SpO2 94%   BMI 24.74 kg/m²       Physical Exam  General Appearance:        Awake, Alert, Oriented x3   Head:    Normocephalic   Eyes:    No icterus   Respiratory:     Clear to auscultation bilaterally   Cardiovascular:    Regular rate and rhythm   GI:     Soft, non-tender, bowel sounds active    Psychiatric   Appropriate, cooperative        LINES, CATHETERS, DRAINS, AIRWAYS, AND WOUNDS   Lines, Drains, Airways, Wounds:  Peripheral IV 10/13/20 Anterior;Left;Proximal Forearm (Active)   Number of days: 1       Surgical Incision Knee Left (Active)   Number of days: 1       Comments:      LABS / IMAGING / TELE      Labs  Results from last 7 days   Lab Units 10/14/20  0345   WBC K/uL 11.20*   HEMOGLOBIN g/dL 12.0*   HEMATOCRIT % 35.7*   PLATELETS K/uL 172     Results from last 7 days   Lab Units 10/14/20  0345   SODIUM mEQ/L 140   POTASSIUM mEQ/L 4.2   CHLORIDE mEQ/L 110*   CO2 mEQ/L 18*   BUN mg/dL 71*   CREATININE mg/dL 3.6*   GLUCOSE mg/dL 132*   CALCIUM mg/dL 8.2*         ASSESSMENT AND PLAN      * S/P total knee replacement, " left  Assessment & Plan  Continue PT/OT  Continue IS  Continue Bowel regimen  pain meds per ortho  DVT prophylaxis- seen by hematology/oncology preoperatively.  Patient with history of DVT and HIT.  To use aspirin as recommended by them.  Avoid heparin products      CKD (chronic kidney disease)  Assessment & Plan  The patient has polycystic kidneys and chronic kidney disease.  Baseline creatinine is a little above 3.  Follows with nephrology as an outpatient.  Avoid nephrotoxins.  Hold diuretic for now  Creat a little higher today. Encouraged po fluids and keep iv fluids while here  Repeat bmp in am if staying.  If leaving today, repeat bmp within couple days as outpt and f/u with nephrologist.    PMR (polymyalgia rheumatica) (CMS/[facility])  Assessment & Plan  Follow-up with rheumatology  Does not use prednisone daily-uses as needed for flares.    HIT (heparin-induced thrombocytopenia) (CMS/[facility])  Assessment & Plan  Avoid heparin products    History of DVT (deep vein thrombosis)  Assessment & Plan  Seen by hematology preoperatively.  They recommend aspirin for DVT prophylaxis.  Avoid heparin products given history of HIT    HLD (hyperlipidemia)  Assessment & Plan   not on statin.  Follow-up as outpatient    Essential hypertension  Assessment & Plan  Continue ACE inhibitor  Hold diuretic    Polycystic kidney, adult type  Assessment & Plan  The patient has polycystic kidneys and chronic kidney disease.  Follows with nephrology as an outpatient.  Avoid nephrotoxins.  Hold diuretic for now          Shantanu Herron, DO  10/14/2020

## 2020-10-14 NOTE — DISCHARGE INSTRUCTIONS
Elevated Creatinine 3.6 (baseline 3.1-3.2). Drink plenty of fluid. Hold your chlorthalidone until you get repeat blood work.  Follow-up with Nephrologist within 2-3 days for repeat BMP.       DVT Prophylaxis:   -Continue with Aspirin 81 mg by mouth twice daily X 4 weeks for blood clot prevention.     Incision Care:  -Please remove your surgical dressing on 10/18/2020. At that time if the wound is dry and not draining, you can leave it uncovered, open to air. If there is drainage, please place 4x4s covered by paper tape over your incision.  -Please do not submerge incision in water, no baths, no swimming, no pools, no hot tubs, etc.   -Please keep incision clean and dry. Once your dressing has been removed, do not scrub the incision in the shower and pat dry with a clean towel not used to dry your body.   -Please make sure your incision(s) are checked for signs and symptoms of infection: If any of the below should occur, please call the office:   -drainage from incision site, opening of incision, increased redness and/or tenderness, fevers greater than 101, flu-like symptoms.   -Please call office or go to ED with any thigh/calf pain or swelling in legs, chest pain, chest congestion, problems with breathing.     Constipation/Pain Medication:   -Take your pain medication with food. Do not drive while taking pain medication.   -Pain medications may cause constipation.   -If you have not had a bowel movement in 3 days please call the office   - Please take Senna-Colace as prescribed. Hold for loose stool. If you are having difficulties having a bowel   movement or haven't had bowel movement in 2 days, please add over the counter miralax and take as directed on package.   -Drink plenty of fluids and eat fresh fruits and vegetables.   -DO NOT SMOKE! Smoking is not healthy for your healing process.

## 2020-10-14 NOTE — PROGRESS NOTES
Ortho Progress Note    S: No acute events. Patient having significant pain overnight, not controlled with orals, given IV dilaudid x2, with minimal relief.     O:   Vitals:    10/14/20 0341   BP: (!) 153/90   Pulse: 94   Resp: 17   Temp: 37 °C (98.6 °F)   SpO2:      SILT  +EHL/FHL/PF/DF  +DP/PT  Dressing CDI    A/P:  65 y.o. M s/p left TKA POD 1     - WBAT  - pain control  - PT/OT   - DVT prophylaxis  - medical management per HMS

## 2020-10-14 NOTE — PROGRESS NOTES
Patient: David Tapia  Location: [facility] 5PAV 5434  MRN: [id number]  Today's date: 10/14/2020     Patient sitting in recliner in ortho gym class     David is a 65 y.o. male admitted on 10/13/2020 with S/P total knee arthroplasty, left. Principal problem is S/P total knee replacement, left.    Past Medical History  David has a past medical history of Arthritis, Chronic kidney disease, Deep vein thrombosis (CMS/[facility]) (2010), History of transfusion (2010), Hypertension, Lipid disorder, Lyme disease, Nocturia, PE (pulmonary thromboembolism) (CMS/[facility]) (2010), PMR (polymyalgia rheumatica) (CMS/[facility]), Polycystic kidney disease, Secondary hyperparathyroidism of renal origin (CMS/[facility]), Shingles (2010), and Vitamin D deficiency.    History of Present Illness   s/p L TKA  WBAT     OT Pain    Date/Time Pain Type Pref Pain Scale Side Location Rating: Rest Rating: Activity Interventions [facility]   10/14/20 1203 Pain Assessment word (verbal rating pain scale) Left knee 2 - mild pain 2 - mild pain position adjusted CP          Prior Living Environment      Most Recent Value   Living Environment Comment  Lives with wife in a house with 2 ALEXUS. B&B on first floor. Tub shower. No GB no SC.           Prior Level of Function      Most Recent Value   Dominant Hand  right   Ambulation  independent   Transferring  independent   Toileting  independent   Bathing  independent   Dressing  independent   Eating  independent   Prior Level of Function Comment  I PTA no AD. 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 +    Assistive Device/Animal Currently Used at Home  none          Occupational Profile      Most Recent Value   Reason for Services/Referral  ADL /ambulation    Patient Goals  to get stronger           OT Evaluation and Treatment - 10/14/20 1203        Time Calculation    Start Time  1203     Stop Time  1231     Time Calculation (min)  28 min        Session Details    Document Type  daily treatment/progress note     Mode of  Treatment  group therapy;occupational therapy        General Information    Patient Profile Reviewed?  yes     General Observations of Patient  received sitting in recliner in ortho gym     Existing Precautions/Restrictions  fall;weight bearing        Weight-Bearing Status    Left LE Weight-Bearing Status  weight-bearing as tolerated (WBAT)        Cognition/Psychosocial    Affect/Mental Status (Cognitive)  WFL     Orientation Status (Cognition)  oriented x 3     Follows Commands (Cognition)  WFL     Cognitive Function (Cognitive)  WFL        Hearing Assessment    Hearing Status  WFL        Vision Assessment/Intervention    Visual Impairment/Limitations  corrective lenses full time        Range of Motion (ROM)    Range of Motion  ROM is WFL;bilateral upper extremities        Strength (Manual Muscle Testing)    Strength (Manual Muscle Testing)  strength is WFL;bilateral upper extremities        Bed Mobility    [place], Supine to Sit  distant supervision     [place], Sit to Supine  distant supervision     Comment (Bed Mobility)  flat surface         Transfers    Transfers  tub transfer     Maintains Weight-Bearing Status (Transfers)  able to maintain weight-bearing status     Comment  DS w/ RW         Bed to Chair Transfer    [place], Bed to Chair  distant supervision     Assistive Device  walker, front-wheeled        Chair to Bed Transfer    [place], Chair to Bed  distant supervision     Assistive Device  walker, front-wheeled        Sit to Stand Transfer    [place], Sit to Stand Transfer  distant supervision     Assistive Device  walker, front-wheeled        Stand to Sit Transfer    [place], Stand to Sit Transfer  distant supervision     Assistive Device  walker, front-wheeled        Tub Transfer    Transfer Technique  step over, right entry     [place], Tub Transfer  supervision     Assistive Device  walker, front-wheeled        Balance    Balance Assessment  sitting static balance      Static Sitting Balance  WFL     Dynamic Sitting Balance  WFL     Sit to Stand Dynamic Balance  WFL     Static Standing Balance  WFL     Dynamic Standing Balance  mild impairment        Lower Body Dressing    [place] Assistance  dons/doffs left sock;dons/doffs right sock     [place]  supervision     Position  supported sitting        AM-PAC (TM) - ADL (Current Function)    Putting on and taking off regular lower body clothing?  3 - A Little     Bathing?  3 - A Little     Toileting?  3 - A Little     Putting on/taking off regular upper body clothing?  4 - None     How much help for taking care of personal grooming?  3 - A Little     Eating meals?  4 - None     AM-PAC (TM) ADL Score  20        Therapy Assessment/Plan (OT)    Rehab Potential (OT)  good, to achieve stated therapy goals     Therapy Frequency (OT)  3-5 times/wk     Criteria for Skilled Therapeutic Interventions Met (OT)  skilled treatment is necessary     Problem List (OT)  strength     OT Diagnosis  L TKR      Patient/Family Therapy Goal Statement (OT)   to get stronger & go home         Progress Summary (OT)    Daily Outcome Statement (OT)  [facility] 20 : Patient is making gians w/ his LE adl;s, bed mobility , STS plus Rw mobility activities  w/ incr strength/endurance         Therapy Plan Review/Discharge Plan (OT)    OT Recommended Discharge Disposition  home with assist     Anticipated Equipment Needs At Discharge (OT)  bathing equipment;dressing equipment;walker, front-wheeled                   Education provided this session. See the Patient Education summary report for full details.         OT Goals      Most Recent Value   Bed Mobility Goal 1   Activity/Assistive Device  sit to supine, supine to sit at 10/14/2020 1203   [place]  modified independence at 10/14/2020 1203   Time Frame  by discharge at 10/14/2020 1203   Progress/Outcome  goal ongoing at 10/14/2020 1203   Transfer Goal 1   Activity/Assistive Device  sit-to-stand/stand-to-sit,  bed-to-chair/chair-to-bed at 10/14/2020 1203   [place]  modified independence at 10/14/2020 1203   Time Frame  by discharge at 10/14/2020 1203   Progress/Outcome  goal ongoing at 10/14/2020 1203   Dressing Goal 1   Activity/Adaptive Equipment  lower body dressing at 10/14/2020 1203   [place]  modified independence at 10/14/2020 1203   Time Frame  by discharge at 10/14/2020 1203   Progress/Outcome  goal ongoing at 10/14/2020 1203

## 2020-10-14 NOTE — PROGRESS NOTES
Patient: David Tapia  Location: [facility] 5PAV 5434  MRN: [id number]  Today's date: 10/14/2020     Patient treatment session complete. Attending RN cleared patient and gave consent for therapy to see patient for PT session. Patient positioned comfortably in bedside recliner with call bell in reach and alarm status on. Pt did not report or present with any chest pain, SOB or nausea, no neuro signs/symptoms. Attending RN aware of treatment session progress.     David is a 65 y.o. male admitted on 10/13/2020 with S/P total knee arthroplasty, left. Principal problem is S/P total knee replacement, left.    Past Medical History  David has a past medical history of Arthritis, Chronic kidney disease, Deep vein thrombosis (CMS/[facility]) (2010), History of transfusion (2010), Hypertension, Lipid disorder, Lyme disease, Nocturia, PE (pulmonary thromboembolism) (CMS/[facility]) (2010), PMR (polymyalgia rheumatica) (CMS/[facility]), Polycystic kidney disease, Secondary hyperparathyroidism of renal origin (CMS/[facility]), Shingles (2010), and Vitamin D deficiency.    History of Present Illness   s/p L TKA    PT Pain    Date/Time Pain Type Pref Pain Scale Side Location Rating: Rest Rating: Rest Rating: Activity [facility]   10/14/20 1143 Pain Assessment word (verbal rating pain scale) Left knee 1 2 - mild pain 2 - mild pain MTC          Prior Living Environment      Most Recent Value   Living Environment Comment  Lives with wife in a house with 2 [place]LEXUS. B&B on first floor. Tub shower. No GB no SC.           Prior Level of Function      Most Recent Value   Dominant Hand  right   Ambulation  independent   Transferring  independent   Toileting  independent   Bathing  independent   Dressing  independent   Eating  independent   Prior Level of Function Comment  I PTA no AD. 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 +    Assistive Device/Animal Currently Used at Home  none          PT Evaluation and Treatment - 10/14/20 1143        Time Calculation     Start Time  1143     Stop Time  1227     Time Calculation (min)  44 min        Session Details    Document Type  daily treatment/progress note     Mode of Treatment  group therapy;physical therapy        General Information    Patient Profile Reviewed?  yes     General Observations of Patient  Pt rec'd at gym     Existing Precautions/Restrictions  fall;weight bearing        Weight-Bearing Status    Left LE Weight-Bearing Status  weight-bearing as tolerated (WBAT)        Cognition/Psychosocial    Affect/Mental Status (Cognitive)  WFL     Orientation Status (Cognition)  oriented x 3     Follows Commands (Cognition)  WFL     Cognitive Function (Cognitive)  WFL        Sensory    Hearing Status  WFL        Vision Assessment/Intervention    Visual Impairment/Limitations  WFL        Range of Motion (ROM)    Left Lower Extremity (ROM)  knee     Knee, Left (ROM)  12 -63        Bed Mobility    Comment (Bed Mobility)  Pt rec'd OOB        Transfers    Transfers  car transfer     Maintains Weight-Bearing Status (Transfers)  able to maintain weight-bearing status        Sit to Stand Transfer    [place], Sit to Stand Transfer  modified independence     Assistive Device  walker, front-wheeled        Stand to Sit Transfer    [place], Stand to Sit Transfer  modified independence     Assistive Device  walker, front-wheeled        Car Transfer    Transfer Technique  sit-stand;stand-sit     [place], Car Transfer  modified independence     Assistive Device  walker, front-wheeled        Gait Training    [place], Gait  modified independence     Assistive Device  walker, front-wheeled     Distance in Feet  120 feet     Gait Pattern Utilized  step-through     Deviations/Abnormal Patterns (Gait)  step length decreased;gait speed decreased;antalgic     Maintains Weight-Bearing Status  able to maintain     Advanced Gait Activity  curb negotiation;10 Meter Walk Test (Self-Selected Velocity)        Curb Negotiation (Mobility)     [place]  modified independence     Comment  Able to perfform without VC's.        10 Meter Walk Test (Self-Selected Velocity)    Trial One: Ten Meter Walk Test (sec)  12 seconds     Trial Two: Ten Meter Walk Test (sec)  11 seconds     Trial One: Gait Speed (m/s)  0.5 m/s     Trial Two: Gait Speed (m/s)  0.55 m/s     Average Gait Speed (m/s): Two Trials  0.52 m/s        Stairs Training    [place], Stairs  supervision     Assistive Device  railing     Handrail Location  both sides     Number of Stairs  4     Ascending Stairs Technique  step-to-step     Descending Stairs Technique  step-to-step     Maintains Weight-Bearing Status (Stairs)  able to maintain     Comment  cues for sequencing for BLE's.        Balance    Balance Assessment  sitting static balance;sitting dynamic balance;sit to stand dynamic balance;standing static balance;standing dynamic balance     Static Sitting Balance  WFL     Dynamic Sitting Balance  WFL     Sit to Stand Dynamic Balance  WFL     Static Standing Balance  WFL     Dynamic Standing Balance  mild impairment     Comment, Balance  No OVERT LOBS        Therapeutic Exercise    Therapeutic Exercise  lower extremity        Lower Extremity (Therapeutic Exercise)    Exercise Position/Type (LE Therapeutic Exercise)  seated     General Exercise (LE Therapeutic Exercise)  ankle pumps;quad sets;SAQ (short arc quad);heel slides;gluteal sets;hip aBduction;hip aDduction     Reps and Sets (LE Therapeutic Exercise)  x10     Comment (LE Therapeutic Exercise)  Pt educated on TE's to perform to increase function ROM and strength of BLE's.         AM-PAC (TM) - Mobility (Current Function)    Turning from your back to your side while in a flat bed without using bedrails?  4 - None     Moving from lying on your back to sitting on the side of a flat bed without using bedrails?  4 - None     Moving to and from a bed to a chair?  4 - None     Standing up from a chair using your arms?  4 - None     To  walk in a hospital room?  4 - None     Climbing 3-5 steps with a railing?  3 - A Little     AM-PAC (TM) Mobility Score  23        Therapy Assessment/Plan (PT)    Rehab Potential (PT)  good, to achieve stated therapy goals     Therapy Frequency (PT)  5-7 times/wk        Progress Summary (PT)    Daily Outcome Statement (PT)  Training conducted with negotiation of curb, car transfers, gait w/ RW and transfer techniques. Pt. demo good carryover with instructions. Also educated in application of surgical precautions with daily activities/mobility. Pt's family not present for treatment. Pt educated on how family can provide assistance / supervision for safety. Pt cleared PT gym     Symptoms Noted During/After Treatment  none        Therapy Plan Review/Discharge Plan (PT)    PT Recommended Discharge Disposition  home with home health;home with outpatient services     Anticipated Equipment Needs at Discharge (PT Eval)  walker, front-wheeled        Plan of Care Review    Plan of Care Reviewed With  patient                RW issued- pt. verbalizes understanding of proper care/maintenance and use of RW after demonstration/instruction.   Equipment Provided: Walker, with Wheels, Adult   Vendor: [employer]    Pt educated and trialed curb, car, stairs, ambulation, and transfer techniques during treatment session. Pt reported feeling comfortable with techniques. Pt educated on TE's to perform to increase function ROM and strength of BLE's.     PT Goals      Most Recent Value   Transfer Goal 1   Activity/Assistive Device  sit-to-stand/stand-to-sit, bed-to-chair/chair-to-bed, car transfer at 10/13/2020 1644   [place]  modified independence at 10/13/2020 1644   Time Frame  by discharge at 10/13/2020 1644   Progress/Outcome  goal partially met at 10/14/2020 1143   Gait Training Goal 1   Activity/Assistive Device  gait (walking locomotion), walker, front-wheeled at 10/13/2020 1644   [place]  modified  independence at 10/13/2020 1644   Distance  150 at 10/13/2020 1644   Time Frame  by discharge at 10/13/2020 1644   Progress/Outcome  goal partially met at 10/14/2020 1143   Stairs Goal 1   Activity/Assistive Device  stairs, all skills, using handrail, left, using handrail, right at 10/13/2020 1644   [place]  modified independence at 10/13/2020 1644   Number of Stairs  2 at 10/13/2020 1644   Time Frame  by discharge at 10/13/2020 1644   Progress/Outcome  goal partially met at 10/14/2020 1143

## 2020-10-14 NOTE — PATIENT CARE CONFERENCE
Care Progression Rounds Note  Date: 10/14/2020  Time: 10:04 AM     Patient Name: David Tapia     Medical Record Number: [id number]   YOB: 1955  Sex: Male      Room/Bed: 5434    Admitting Diagnosis: Osteoarthritis of left knee, unspecified osteoarthritis type [M17.12]  S/P total knee arthroplasty, left [Z96.652]   Admit Date/Time: 10/13/2020  8:31 AM    Primary Diagnosis: S/P total knee replacement, left  Principal Problem: S/P total knee replacement, left    GMLOS: pending  Anticipated Discharge Date: 10/15/2020    AM-PAC  Mobility Score: 18    Discharge Planning:       Barriers to Discharge:  Barriers to Discharge: Therapy update pending, Medical issues not resolved    Participants:  advanced practice provider, , nursing, social work/services, physical therapy

## 2020-10-14 NOTE — PLAN OF CARE
Problem: Adult Inpatient Plan of Care  Goal: Plan of Care Review  10/14/2020 1604 by Nohemi Alcantar OT  Outcome: Progressing  Flowsheets (Taken 10/14/2020 1203)  Progress: improving  Plan of Care Reviewed With: patient  Outcome Summary: Patient is making gains w/ his LE adl's, tub transfers, flat   bed mobility, STS  plus Rw mobility activities  w/ incr strength /endurance     Problem: Joint Function Impaired (Knee Arthroplasty)  Goal: Optimal Functional Ability  10/14/2020 1604 by Nohemi Alcantar, OT  Reactivated

## 2020-12-03 ENCOUNTER — APPOINTMENT (OUTPATIENT)
Dept: LAB | Facility: HOSPITAL | Age: 65
End: 2020-12-03
Attending: ORTHOPAEDIC SURGERY
Payer: COMMERCIAL

## 2020-12-03 ENCOUNTER — TRANSCRIBE ORDERS (OUTPATIENT)
Dept: PREADMISSION TESTING | Facility: HOSPITAL | Age: 65
End: 2020-12-03

## 2020-12-03 DIAGNOSIS — Z01.818 ENCOUNTER FOR OTHER PREPROCEDURAL EXAMINATION: ICD-10-CM

## 2020-12-03 DIAGNOSIS — Z11.59 ENCOUNTER FOR SCREENING FOR OTHER VIRAL DISEASES: Primary | ICD-10-CM

## 2020-12-03 LAB
ANION GAP SERPL CALC-SCNC: 11 MEQ/L (ref 3–15)
BASOPHILS # BLD: 0.05 K/UL (ref 0.01–0.1)
BASOPHILS NFR BLD: 0.6 %
BUN SERPL-MCNC: 68 MG/DL (ref 8–20)
CALCIUM SERPL-MCNC: 9.1 MG/DL (ref 8.9–10.3)
CHLORIDE SERPL-SCNC: 107 MEQ/L (ref 98–109)
CO2 SERPL-SCNC: 21 MEQ/L (ref 22–32)
CREAT SERPL-MCNC: 3.9 MG/DL (ref 0.8–1.3)
DIFFERENTIAL METHOD BLD: ABNORMAL
EOSINOPHIL # BLD: 0.34 K/UL (ref 0.04–0.54)
EOSINOPHIL NFR BLD: 4 %
ERYTHROCYTE [DISTWIDTH] IN BLOOD BY AUTOMATED COUNT: 13.8 % (ref 11.6–14.4)
GFR SERPL CREATININE-BSD FRML MDRD: 15.5 ML/MIN/1.73M*2
GLUCOSE SERPL-MCNC: 83 MG/DL (ref 70–99)
HCT VFR BLDCO AUTO: 36.4 % (ref 40.1–51)
HGB BLD-MCNC: 11.4 G/DL (ref 13.7–17.5)
IMM GRANULOCYTES # BLD AUTO: 0.03 K/UL (ref 0–0.08)
IMM GRANULOCYTES NFR BLD AUTO: 0.4 %
LYMPHOCYTES # BLD: 2.4 K/UL (ref 1.2–3.5)
LYMPHOCYTES NFR BLD: 28.5 %
MCH RBC QN AUTO: 29.8 PG (ref 28–33.2)
MCHC RBC AUTO-ENTMCNC: 31.3 G/DL (ref 32.2–36.5)
MCV RBC AUTO: 95.3 FL (ref 83–98)
MONOCYTES # BLD: 0.73 K/UL (ref 0.3–1)
MONOCYTES NFR BLD: 8.7 %
NEUTROPHILS # BLD: 4.87 K/UL (ref 1.7–7)
NEUTS SEG NFR BLD: 57.8 %
NRBC BLD-RTO: 0 %
PDW BLD AUTO: 9.8 FL (ref 9.4–12.4)
PLATELET # BLD AUTO: 299 K/UL (ref 150–350)
POTASSIUM SERPL-SCNC: 4.9 MEQ/L (ref 3.6–5.1)
RBC # BLD AUTO: 3.82 M/UL (ref 4.5–5.8)
SODIUM SERPL-SCNC: 139 MEQ/L (ref 136–144)
WBC # BLD AUTO: 8.42 K/UL (ref 3.8–10.5)

## 2020-12-03 PROCEDURE — 36415 COLL VENOUS BLD VENIPUNCTURE: CPT

## 2020-12-03 PROCEDURE — U0003 INFECTIOUS AGENT DETECTION BY NUCLEIC ACID (DNA OR RNA); SEVERE ACUTE RESPIRATORY SYNDROME CORONAVIRUS 2 (SARS-COV-2) (CORONAVIRUS DISEASE [COVID-19]), AMPLIFIED PROBE TECHNIQUE, MAKING USE OF HIGH THROUGHPUT TECHNOLOGIES AS DESCRIBED BY CMS-2020-01-R: HCPCS

## 2020-12-03 PROCEDURE — 80048 BASIC METABOLIC PNL TOTAL CA: CPT

## 2020-12-03 PROCEDURE — 85025 COMPLETE CBC W/AUTO DIFF WBC: CPT

## 2020-12-05 LAB — SARS-COV-2 RNA RESP QL NAA+PROBE: NOT DETECTED

## 2020-12-07 ENCOUNTER — ANESTHESIA EVENT (OUTPATIENT)
Dept: OPERATING ROOM | Facility: HOSPITAL | Age: 65
Setting detail: HOSPITAL OUTPATIENT SURGERY
End: 2020-12-07
Payer: COMMERCIAL

## 2020-12-08 ENCOUNTER — ANESTHESIA (OUTPATIENT)
Dept: OPERATING ROOM | Facility: HOSPITAL | Age: 65
Setting detail: HOSPITAL OUTPATIENT SURGERY
End: 2020-12-08
Payer: COMMERCIAL

## 2020-12-08 ENCOUNTER — HOSPITAL ENCOUNTER (OUTPATIENT)
Facility: HOSPITAL | Age: 65
Setting detail: HOSPITAL OUTPATIENT SURGERY
Discharge: HOME | End: 2020-12-08
Attending: ORTHOPAEDIC SURGERY | Admitting: ORTHOPAEDIC SURGERY
Payer: COMMERCIAL

## 2020-12-08 VITALS
HEIGHT: 70 IN | DIASTOLIC BLOOD PRESSURE: 85 MMHG | WEIGHT: 145 LBS | SYSTOLIC BLOOD PRESSURE: 182 MMHG | TEMPERATURE: 98.2 F | HEART RATE: 72 BPM | BODY MASS INDEX: 20.76 KG/M2 | RESPIRATION RATE: 16 BRPM | OXYGEN SATURATION: 100 %

## 2020-12-08 PROBLEM — Z98.890 S/P LEFT KNEE SURGERY: Status: ACTIVE | Noted: 2020-12-08

## 2020-12-08 PROCEDURE — 63600000 HC DRUGS/DETAIL CODE: Performed by: ORTHOPAEDIC SURGERY

## 2020-12-08 PROCEDURE — 63700000 HC SELF-ADMINISTRABLE DRUG

## 2020-12-08 PROCEDURE — 37000001 HC ANESTHESIA GENERAL: Performed by: ORTHOPAEDIC SURGERY

## 2020-12-08 PROCEDURE — 36000002 HC OR LEVEL 2 INITIAL 30MIN: Performed by: ORTHOPAEDIC SURGERY

## 2020-12-08 PROCEDURE — 71000011 HC PACU PHASE 1 EA ADDL MIN: Performed by: ORTHOPAEDIC SURGERY

## 2020-12-08 PROCEDURE — 63600000 HC DRUGS/DETAIL CODE: Mod: JW | Performed by: ANESTHESIOLOGY

## 2020-12-08 PROCEDURE — 25800000 HC PHARMACY IV SOLUTIONS: Performed by: ANESTHESIOLOGY

## 2020-12-08 PROCEDURE — 3E0U3BZ INTRODUCTION OF ANESTHETIC AGENT INTO JOINTS, PERCUTANEOUS APPROACH: ICD-10-PCS | Performed by: ORTHOPAEDIC SURGERY

## 2020-12-08 PROCEDURE — 71000002 HC PACU PHASE 2 INITIAL 30MIN: Performed by: ORTHOPAEDIC SURGERY

## 2020-12-08 PROCEDURE — 3E0U33Z INTRODUCTION OF ANTI-INFLAMMATORY INTO JOINTS, PERCUTANEOUS APPROACH: ICD-10-PCS | Performed by: ORTHOPAEDIC SURGERY

## 2020-12-08 PROCEDURE — 0SNDXZZ RELEASE LEFT KNEE JOINT, EXTERNAL APPROACH: ICD-10-PCS | Performed by: ORTHOPAEDIC SURGERY

## 2020-12-08 PROCEDURE — 25000000 HC PHARMACY GENERAL: Performed by: ORTHOPAEDIC SURGERY

## 2020-12-08 PROCEDURE — 63600000 HC DRUGS/DETAIL CODE: Performed by: ANESTHESIOLOGY

## 2020-12-08 PROCEDURE — 25000000 HC PHARMACY GENERAL: Performed by: ANESTHESIOLOGY

## 2020-12-08 PROCEDURE — 71000001 HC PACU PHASE 1 INITIAL 30MIN: Performed by: ORTHOPAEDIC SURGERY

## 2020-12-08 PROCEDURE — 71000012 HC PACU PHASE 2 EA ADDL MIN: Performed by: ORTHOPAEDIC SURGERY

## 2020-12-08 RX ORDER — ACETAMINOPHEN 500 MG
500 TABLET ORAL EVERY 6 HOURS PRN
COMMUNITY

## 2020-12-08 RX ORDER — HYDROMORPHONE HYDROCHLORIDE 1 MG/ML
0.5 INJECTION, SOLUTION INTRAMUSCULAR; INTRAVENOUS; SUBCUTANEOUS
Status: DISCONTINUED | OUTPATIENT
Start: 2020-12-08 | End: 2020-12-08

## 2020-12-08 RX ORDER — DEXTROSE 40 %
15-30 GEL (GRAM) ORAL AS NEEDED
Status: DISCONTINUED | OUTPATIENT
Start: 2020-12-08 | End: 2020-12-08

## 2020-12-08 RX ORDER — OXYCODONE HYDROCHLORIDE 5 MG/1
TABLET ORAL
Status: COMPLETED
Start: 2020-12-08 | End: 2020-12-08

## 2020-12-08 RX ORDER — SODIUM CHLORIDE 9 MG/ML
INJECTION, SOLUTION INTRAVENOUS CONTINUOUS
Status: DISCONTINUED | OUTPATIENT
Start: 2020-12-08 | End: 2020-12-08 | Stop reason: HOSPADM

## 2020-12-08 RX ORDER — DEXTROSE 50 % IN WATER (D50W) INTRAVENOUS SYRINGE
25 AS NEEDED
Status: DISCONTINUED | OUTPATIENT
Start: 2020-12-08 | End: 2020-12-08 | Stop reason: HOSPADM

## 2020-12-08 RX ORDER — IBUPROFEN 200 MG
16-32 TABLET ORAL AS NEEDED
Status: DISCONTINUED | OUTPATIENT
Start: 2020-12-08 | End: 2020-12-08 | Stop reason: HOSPADM

## 2020-12-08 RX ORDER — ONDANSETRON HYDROCHLORIDE 2 MG/ML
4 INJECTION, SOLUTION INTRAVENOUS
Status: DISCONTINUED | OUTPATIENT
Start: 2020-12-08 | End: 2020-12-08

## 2020-12-08 RX ORDER — SODIUM CHLORIDE 9 MG/ML
INJECTION, SOLUTION INTRAVENOUS CONTINUOUS
Status: CANCELLED | OUTPATIENT
Start: 2020-12-08 | End: 2020-12-09

## 2020-12-08 RX ORDER — BUPIVACAINE HYDROCHLORIDE AND EPINEPHRINE 5; 5 MG/ML; UG/ML
INJECTION, SOLUTION EPIDURAL; INTRACAUDAL; PERINEURAL AS NEEDED
Status: DISCONTINUED | OUTPATIENT
Start: 2020-12-08 | End: 2020-12-08 | Stop reason: HOSPADM

## 2020-12-08 RX ORDER — DEXTROSE 50 % IN WATER (D50W) INTRAVENOUS SYRINGE
25 AS NEEDED
Status: DISCONTINUED | OUTPATIENT
Start: 2020-12-08 | End: 2020-12-08

## 2020-12-08 RX ORDER — PROPOFOL 10 MG/ML
INJECTION, EMULSION INTRAVENOUS CONTINUOUS PRN
Status: DISCONTINUED | OUTPATIENT
Start: 2020-12-08 | End: 2020-12-08 | Stop reason: SURG

## 2020-12-08 RX ORDER — ACETAMINOPHEN 325 MG/1
650 TABLET ORAL EVERY 4 HOURS PRN
Status: DISCONTINUED | OUTPATIENT
Start: 2020-12-08 | End: 2020-12-08 | Stop reason: HOSPADM

## 2020-12-08 RX ORDER — NAPROXEN SODIUM 220 MG/1
81 TABLET, FILM COATED ORAL 2 TIMES DAILY
Qty: 56 TABLET | Refills: 0
Start: 2020-12-08 | End: 2021-01-05

## 2020-12-08 RX ORDER — PROPOFOL 10 MG/ML
INJECTION, EMULSION INTRAVENOUS AS NEEDED
Status: DISCONTINUED | OUTPATIENT
Start: 2020-12-08 | End: 2020-12-08 | Stop reason: SURG

## 2020-12-08 RX ORDER — ONDANSETRON 4 MG/1
4 TABLET, ORALLY DISINTEGRATING ORAL EVERY 8 HOURS PRN
Status: DISCONTINUED | OUTPATIENT
Start: 2020-12-08 | End: 2020-12-08 | Stop reason: HOSPADM

## 2020-12-08 RX ORDER — LIDOCAINE HYDROCHLORIDE 10 MG/ML
INJECTION, SOLUTION INFILTRATION; PERINEURAL AS NEEDED
Status: DISCONTINUED | OUTPATIENT
Start: 2020-12-08 | End: 2020-12-08 | Stop reason: SURG

## 2020-12-08 RX ORDER — OXYCODONE HYDROCHLORIDE 5 MG/1
5-10 TABLET ORAL
Status: DISCONTINUED | OUTPATIENT
Start: 2020-12-08 | End: 2020-12-08 | Stop reason: HOSPADM

## 2020-12-08 RX ORDER — METHYLPREDNISOLONE ACETATE 40 MG/ML
INJECTION, SUSPENSION INTRA-ARTICULAR; INTRALESIONAL; INTRAMUSCULAR; SOFT TISSUE AS NEEDED
Status: DISCONTINUED | OUTPATIENT
Start: 2020-12-08 | End: 2020-12-08 | Stop reason: HOSPADM

## 2020-12-08 RX ORDER — IBUPROFEN 200 MG
16-32 TABLET ORAL AS NEEDED
Status: DISCONTINUED | OUTPATIENT
Start: 2020-12-08 | End: 2020-12-08

## 2020-12-08 RX ORDER — TALC
6 POWDER (GRAM) TOPICAL NIGHTLY
COMMUNITY

## 2020-12-08 RX ORDER — DEXTROSE 40 %
15-30 GEL (GRAM) ORAL AS NEEDED
Status: DISCONTINUED | OUTPATIENT
Start: 2020-12-08 | End: 2020-12-08 | Stop reason: HOSPADM

## 2020-12-08 RX ADMIN — HYDROMORPHONE HYDROCHLORIDE 0.5 MG: 1 INJECTION, SOLUTION INTRAMUSCULAR; INTRAVENOUS; SUBCUTANEOUS at 09:13

## 2020-12-08 RX ADMIN — LIDOCAINE HYDROCHLORIDE 3 ML: 10 INJECTION, SOLUTION INFILTRATION; PERINEURAL at 08:35

## 2020-12-08 RX ADMIN — OXYCODONE HYDROCHLORIDE 5 MG: 5 TABLET ORAL at 10:49

## 2020-12-08 RX ADMIN — PROPOFOL 150 MG: 10 INJECTION, EMULSION INTRAVENOUS at 08:35

## 2020-12-08 RX ADMIN — PROPOFOL 100 MCG/KG/MIN: 10 INJECTION, EMULSION INTRAVENOUS at 08:35

## 2020-12-08 RX ADMIN — HYDROMORPHONE HYDROCHLORIDE 0.5 MG: 1 INJECTION, SOLUTION INTRAMUSCULAR; INTRAVENOUS; SUBCUTANEOUS at 08:55

## 2020-12-08 RX ADMIN — SODIUM CHLORIDE: 900 INJECTION, SOLUTION INTRAVENOUS at 08:03

## 2020-12-08 ASSESSMENT — ENCOUNTER SYMPTOMS: DYSRHYTHMIAS: 1

## 2020-12-08 ASSESSMENT — PAIN - FUNCTIONAL ASSESSMENT: PAIN_FUNCTIONAL_ASSESSMENT: 0-10

## 2020-12-08 ASSESSMENT — PAIN SCALES - GENERAL: PAIN_LEVEL: 0

## 2020-12-08 NOTE — OP NOTE
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 Goldman    Estimated Blood Loss: None    Fluids Replaced: 200 cc saline    Complications: None    Specimen: None    Findings: Significant decreased range of motion left knee    Description of Procedure: After induction of general anesthesia an appropriate timeout was performed to confirm the side and site of the surgery.  His left knee was examined.  His range of motion was from 10 degrees to 80 degrees.    Left knee was prepped with ChloraPrep.  An arthrocentesis was performed laterally and his knee was injected with a solution of 30 cc half percent Marcaine with epinephrine as well as 120 mg of Depo-Medrol.    Gently manipulated.  I was able to get motion from about 5 degrees to 125 degrees.  Band-Aid was applied to the site of the arthrocentesis.    Patient tolerated procedure well in the operating room in satisfactory condition.  I was present and scrubbed through all the major components of the operation.    Yoan Cordova MD

## 2020-12-08 NOTE — DISCHARGE INSTRUCTIONS
DVT Prophylaxis:  -Continue with Aspirin 81mg by mouth twice daily X 4 weeks for blood clot prevention.      Incision Care:   - Please keep your incision(s) clean and dry. You may remove bandaid tomorrow.   - Make sure your incision(s) are checked at least twice daily for signs and symptoms of infection.   If any of the below should occur, please call the office:  - Drainage from incision site  - Opening of incisions  - Fevers greater than 101  - Flu-like symptoms  - Increased redness and/or tenderness  Please call office or go to emergency department if :  - Thigh/calf pain or swelling in legs  - Chest pain  - Chest congestion  - Problems with breathing.

## 2020-12-08 NOTE — ANESTHESIOLOGIST PRE-PROCEDURE ATTESTATION
Pre-Procedure Patient Identification:  I am the Primary Anesthesiologist and have identified the patient on 12/08/20 at 8:31 AM.   I have confirmed the following procedure(s) Arthrocentesis And Manipulation Under Anesthesia Left Knee (L) will be performed by the following surgeon/proceduralist Yoan Cordova MD.

## 2020-12-08 NOTE — PERIOPERATIVE NURSING NOTE
VSS. Ambulated. Left knee bandaid clean dry and intact; Left knee with mild swelling, but neurovascular returned to baseline. +pulses distally. Tolerating clear liquids and crackers. Voided x 1 QS. Meets criteria for discharge to home

## 2020-12-08 NOTE — ANESTHESIA POSTPROCEDURE EVALUATION
Patient: David Tapia    Procedure Summary     Date: 12/08/20 Room / Location: [facility] PAV OR 01 / [facility]    Anesthesia Start: 0830 Anesthesia Stop: 0855    Procedure: Arthrocentesis And Manipulation Under Anesthesia Left Knee (Left Knee) Diagnosis:       Knee stiff, left      (Knee stiff, left [M25.662])    Surgeon: Yoan Cordova MD Responsible Provider: Noah Goldman MD PhD    Anesthesia Type: general ASA Status: 4          Anesthesia Type: general  PACU Vitals     No data found in the last 10 encounters.            Anesthesia Post Evaluation    Pain score: 0  Pain management: adequate  Mode of pain management: IV medication  Patient location during evaluation: PACU  Patient participation: complete - patient participated  Level of consciousness: awake and alert and arousable  Cardiovascular status: acceptable and hemodynamically stable  Airway Patency: adequate  Respiratory status: acceptable  Hydration status: acceptable  Anesthetic complications: no

## 2020-12-08 NOTE — ANESTHESIA PREPROCEDURE EVALUATION
Relevant Problems   CARDIOVASCULAR   (+) Essential hypertension      HEMATOLOGY   (+) HIT (heparin-induced thrombocytopenia) (CMS/HCC)      MUSCULOSKELETAL   (+) Primary osteoarthritis of left knee      NEUROLOGY   (+) History of DVT (deep vein thrombosis)       Anesthesia ROS/MED HX      Cardiovascular   hypertension  Dysrhythmias   Covid19 Test Reviewed and ECG reviewed  Renal Disease   chronic renal insufficiency  Endo/Other  Body Habitus: Normal  ROS/MED HX Comments:    Neurology/Psychology: Shingles, torso  Lyme disease   Cardiology: PE, Sabino Lower Lobes  DVT right leg  Inferior vena cava obstruction   Endo: Hyperparathyroidism secondary to renal disease   Musculoskeletal: Polymyalgia rheumatica       Past Surgical History:   Procedure Laterality Date   • COLONOSCOPY     • HERNIA REPAIR Left     inguinal   • JOINT REPLACEMENT     • OTHER SURGICAL HISTORY  2010    IVC filter   • WISDOM TOOTH EXTRACTION         Physical Exam    Anesthesia Plan    Plan: general    Technique: total IV anesthesia and general mask   ASA 4  Blood Products:   Use of Blood Products Discussed: No   Anesthetic plan and risks discussed with: patient  Induction:    intravenous   Postop Plan:   Patient Disposition: phase II then home   Pain Management: IV analgesics

## 2020-12-08 NOTE — H&P
Ortho H&P  Admitting Diagnosis:  Knee stiff, left [M25.662]  HPI     Patient is a 65 y.o. male with h/o CKD, prior PE, HTN presenting with L knee stiffness over the last couple of months. He had a L TKA with Dr. Cordova on 10/13/20. He has been undergoing physical therapy for improving ROM and has been able to passively flex knee to 90 degrees.    Medical History:   Past Medical History:   Diagnosis Date   • Arthritis    • Chronic kidney disease    • Deep vein thrombosis (CMS/HCC) 2010    right leg   • History of transfusion 2010   • Hypertension    • IVC (inferior vena cava obstruction)    • Lipid disorder    • Lyme disease    • Nocturia    • PE (pulmonary thromboembolism) (CMS/HCC) 2010    B/L lower lobes   • PMR (polymyalgia rheumatica) (CMS/[facility])     sees Dr. Jeronimo-last visit 1 1/2 yrs ago   • Polycystic kidney disease     sees Dr. Sandoval every 4 months   • Secondary hyperparathyroidism of renal origin (CMS/[facility])    • Shingles 2010    torso   • Vitamin D deficiency        Surgical History:   Past Surgical History:   Procedure Laterality Date   • COLONOSCOPY     • HERNIA REPAIR Left     inguinal   • JOINT REPLACEMENT     • OTHER SURGICAL HISTORY  2010    IVC filter   • WISDOM TOOTH EXTRACTION         Social History:   Social History     Social History Narrative   • Not on file       Family History:   Family History   Problem Relation Age of Onset   • Polycystic kidney disease Biological Mother    • Lung cancer Biological Father        Allergies: Heparin and Nsaids (non-steroidal anti-inflammatory drug)       Medication List      ASK your doctor about these medications    acetaminophen 500 mg tablet  Commonly known as: TYLENOL  Take 500 mg by mouth every 6 (six) hours as needed for mild pain.  Dose: 500 mg     chlorthalidone 25 mg tablet  Commonly known as: HYGROTEN  Hold until told to restart from nephrologist.     cholecalciferol (vitamin D3) 50 mcg (2,000 unit) tablet  Take 2,000 Units by mouth every  morning.  Dose: 2,000 Units     lisinopriL 40 mg tablet  Commonly known as: PRINIVIL  Take 40 mg by mouth every morning.  Dose: 40 mg     melatonin 3 mg tablet  Take 6 mg by mouth nightly.  Dose: 6 mg     polyethylene glycol 17 gram packet  Commonly known as: MIRALAX  Take 17 g by mouth daily as needed (Constipation).  Dose: 17 g     predniSONE 5 mg tablet  Commonly known as: DELTASONE  Take 5 mg by mouth daily as needed. Takes for PMR  Dose: 5 mg     sennosides-docusate sodium 8.6-50 mg  Commonly known as: SENNA WITH DOCUSATE SODIUM  Take 1 tablet by mouth 2 (two) times a day. To prevent constipation associated with pain medication, hold for loose stool  Dose: 1 tablet     traMADoL 50 mg tablet  Commonly known as: ULTRAM  Take 50 mg by mouth every 6 (six) hours as needed for moderate pain.  Dose: 50 mg          Review of Systems  All other systems reviewed and negative except as noted in the HPI.    Objective     Vital Signs for the last 24 hours:  Temp:  [36.6 °C (97.8 °F)] 36.6 °C (97.8 °F)  Heart Rate:  [67] 67  Resp:  [16] 16  BP: (170)/(88) 170/88    Physical Exam  Hip/Knee:  General Appearance:  Well nourished, well developed, no acute distress  BMI: Body mass index is 20.81 kg/m².  Psychiatric:  Alert, oriented x3, normal affect  Lungs:  Respirations unlabored  Heart:  Regular rate and rhythm  Skin:  Normal color  Lymphatic:  No gross lymphadenopathy  Extremities:   Upper: Palpable peripheral pulses  Lower: Palpable peripheral pulses  Musculoskeletal Exam:     Right Lower Extremity  Inspection: Atraumatic, no obvious deformity, skin intact  Palpation: No TTP throughout  Motor: Fires IP/Q/TA/EHL/GS  Sensory: SILT SPN/DPN/T/S/S distributions  Vascular: Palpable DP/PT pulses, Toes WWP  ROM: Full, passive and active    Left Lower Extremity  Inspection: Atraumatic, well healed prior TKA scar, skin intact  Palpation: mild global L knee swelling  Motor: Fires IP/Q/TA/EHL/GS  Sensory: SILT SPN/DPN/T/S/S  distributions  Vascular: Palpable DP/PT pulses, Toes WWP  ROM: actively flexes knee 40 degrees, passive ROM deferred due to pain    Labs  No new labs.    Imaging  I have reviewed the Imaging from the last 24 hrs.      Assessment/Plan     66 yo with h/o CKD, prior PE, HTN presenting with L knee stiffness over the last couple of months. He had a L TKA with Dr. Cordova on 10/13/20.     -Plan for L knee manipulation under anesthesia    Code Status: Prior

## 2020-12-08 NOTE — ANESTHESIA PROCEDURE NOTES
Airway  Urgency: elective      General Information and Staff    Patient location during procedure: OR  Anesthesiologist: Noah Goldman MD PhD  Performed: anesthesiologist     Indications and Patient Condition  Indications for airway management: anesthesia  Sedation level: general  Preoxygenated: yes  Patient position: neutral.  Mask difficulty assessment: 0 - not attempted    Final Airway Details  Final airway type: mask        Number of attempts at approach: 1  Number of other approaches attempted: 0  Atraumatic airway insertion

## 2022-03-29 ENCOUNTER — APPOINTMENT (RX ONLY)
Dept: URBAN - METROPOLITAN AREA CLINIC 26 | Facility: CLINIC | Age: 67
Setting detail: DERMATOLOGY
End: 2022-03-29

## 2022-03-29 DIAGNOSIS — Z12.83 ENCOUNTER FOR SCREENING FOR MALIGNANT NEOPLASM OF SKIN: ICD-10-CM

## 2022-03-29 DIAGNOSIS — L82.1 OTHER SEBORRHEIC KERATOSIS: ICD-10-CM

## 2022-03-29 DIAGNOSIS — D22 MELANOCYTIC NEVI: ICD-10-CM

## 2022-03-29 DIAGNOSIS — L82.0 INFLAMED SEBORRHEIC KERATOSIS: ICD-10-CM

## 2022-03-29 DIAGNOSIS — B07.8 OTHER VIRAL WARTS: ICD-10-CM

## 2022-03-29 DIAGNOSIS — L81.4 OTHER MELANIN HYPERPIGMENTATION: ICD-10-CM

## 2022-03-29 DIAGNOSIS — D18.0 HEMANGIOMA: ICD-10-CM

## 2022-03-29 PROBLEM — D48.5 NEOPLASM OF UNCERTAIN BEHAVIOR OF SKIN: Status: ACTIVE | Noted: 2022-03-29

## 2022-03-29 PROBLEM — D22.61 MELANOCYTIC NEVI OF RIGHT UPPER LIMB, INCLUDING SHOULDER: Status: ACTIVE | Noted: 2022-03-29

## 2022-03-29 PROBLEM — D22.62 MELANOCYTIC NEVI OF LEFT UPPER LIMB, INCLUDING SHOULDER: Status: ACTIVE | Noted: 2022-03-29

## 2022-03-29 PROBLEM — D18.01 HEMANGIOMA OF SKIN AND SUBCUTANEOUS TISSUE: Status: ACTIVE | Noted: 2022-03-29

## 2022-03-29 PROBLEM — D22.5 MELANOCYTIC NEVI OF TRUNK: Status: ACTIVE | Noted: 2022-03-29

## 2022-03-29 PROBLEM — D22.39 MELANOCYTIC NEVI OF OTHER PARTS OF FACE: Status: ACTIVE | Noted: 2022-03-29

## 2022-03-29 PROCEDURE — ? TREATMENT REGIMEN

## 2022-03-29 PROCEDURE — ? BIOPSY BY SHAVE METHOD

## 2022-03-29 PROCEDURE — 17110 DESTRUCTION B9 LES UP TO 14: CPT

## 2022-03-29 PROCEDURE — 11102 TANGNTL BX SKIN SINGLE LES: CPT | Mod: 59

## 2022-03-29 PROCEDURE — 99203 OFFICE O/P NEW LOW 30 MIN: CPT | Mod: 25

## 2022-03-29 PROCEDURE — ? LIQUID NITROGEN

## 2022-03-29 PROCEDURE — ? ADDITIONAL NOTES

## 2022-03-29 PROCEDURE — ? COUNSELING

## 2022-03-29 ASSESSMENT — LOCATION DETAILED DESCRIPTION DERM
LOCATION DETAILED: LEFT INFERIOR CENTRAL MALAR CHEEK
LOCATION DETAILED: RIGHT SUPERIOR MEDIAL MIDBACK
LOCATION DETAILED: LEFT RING PROXIMAL INTERPHALANGEAL JOINT
LOCATION DETAILED: EPIGASTRIC SKIN
LOCATION DETAILED: LEFT DISTAL POSTERIOR UPPER ARM
LOCATION DETAILED: RIGHT DISTAL POSTERIOR UPPER ARM
LOCATION DETAILED: RIGHT POSTERIOR NECK
LOCATION DETAILED: LEFT ANTERIOR DISTAL UPPER ARM
LOCATION DETAILED: RIGHT ELBOW
LOCATION DETAILED: PERIUMBILICAL SKIN
LOCATION DETAILED: LEFT ELBOW
LOCATION DETAILED: RIGHT ANTERIOR DISTAL UPPER ARM
LOCATION DETAILED: SUPERIOR LUMBAR SPINE
LOCATION DETAILED: RIGHT VENTRAL PROXIMAL FOREARM
LOCATION DETAILED: LEFT INFERIOR MEDIAL MALAR CHEEK

## 2022-03-29 ASSESSMENT — LOCATION SIMPLE DESCRIPTION DERM
LOCATION SIMPLE: LEFT ELBOW
LOCATION SIMPLE: LEFT CHEEK
LOCATION SIMPLE: RIGHT UPPER ARM
LOCATION SIMPLE: RIGHT LOWER BACK
LOCATION SIMPLE: RIGHT POSTERIOR UPPER ARM
LOCATION SIMPLE: RIGHT FOREARM
LOCATION SIMPLE: LEFT RING FINGER
LOCATION SIMPLE: LEFT UPPER ARM
LOCATION SIMPLE: LOWER BACK
LOCATION SIMPLE: LEFT POSTERIOR UPPER ARM
LOCATION SIMPLE: RIGHT ELBOW
LOCATION SIMPLE: POSTERIOR NECK
LOCATION SIMPLE: ABDOMEN

## 2022-03-29 ASSESSMENT — LOCATION ZONE DERM
LOCATION ZONE: NECK
LOCATION ZONE: TRUNK
LOCATION ZONE: FINGER
LOCATION ZONE: FACE
LOCATION ZONE: ARM

## 2022-03-29 NOTE — PROCEDURE: LIQUID NITROGEN
Spray Paint Text: The liquid nitrogen was applied to the skin utilizing a spray paint frosting technique.
Medical Necessity Information: It is in your best interest to select a reason for this procedure from the list below. All of these items fulfill various CMS LCD requirements except the new and changing color options.
Medical Necessity Clause: This procedure was medically necessary because the lesions that were treated were:
Spray Paint Technique: No
Post-Care Instructions: I reviewed with the patient in detail post-care instructions. Patient is to wear sunprotection, and avoid picking at any of the treated lesions. Pt may apply Vaseline to crusted or scabbing areas.
Render Post-Care Instructions In Note?: yes
Number Of Freeze-Thaw Cycles: 3 freeze-thaw cycles
Detail Level: Detailed
Consent: The patient's consent was obtained including but not limited to risks of crusting, scabbing, blistering, scarring, darker or lighter pigmentary change, recurrence, incomplete removal and infection.

## 2022-03-29 NOTE — PROCEDURE: BIOPSY BY SHAVE METHOD
Detail Level: Detailed
Depth Of Biopsy: dermis
Was A Bandage Applied: Yes
Size Of Lesion In Cm: 0.4
X Size Of Lesion In Cm: 0
Biopsy Type: H and E
Biopsy Method: Dermablade
Anesthesia Type: 1% lidocaine with epinephrine
Anesthesia Volume In Cc (Will Not Render If 0): 0.5
Hemostasis: Drysol
Wound Care: Petrolatum
Dressing: bandage
Destruction After The Procedure: No
Type Of Destruction Used: Curettage
Curettage Text: The wound bed was treated with curettage after the biopsy was performed.
Cryotherapy Text: The wound bed was treated with cryotherapy after the biopsy was performed.
Electrodesiccation Text: The wound bed was treated with electrodesiccation after the biopsy was performed.
Electrodesiccation And Curettage Text: The wound bed was treated with electrodesiccation and curettage after the biopsy was performed.
Silver Nitrate Text: The wound bed was treated with silver nitrate after the biopsy was performed.
Lab: -[id number]
Consent: Written consent was obtained and risks were reviewed including but not limited to scarring, infection, bleeding, scabbing, incomplete removal, nerve damage and allergy to anesthesia.
Post-Care Instructions: I reviewed with the patient in detail post-care instructions. Patient is to keep the biopsy site dry overnight, and then apply bacitracin twice daily until healed. Patient may apply hydrogen peroxide soaks to remove any crusting.
Notification Instructions: Patient will be notified of biopsy results. However, patient instructed to call the office if not contacted within 2 weeks.
Billing Type: Third-Party Bill
Information: Selecting Yes will display possible errors in your note based on the variables you have selected. This validation is only offered as a suggestion for you. PLEASE NOTE THAT THE VALIDATION TEXT WILL BE REMOVED WHEN YOU FINALIZE YOUR NOTE. IF YOU WANT TO FAX A PRELIMINARY NOTE YOU WILL NEED TO TOGGLE THIS TO 'NO' IF YOU DO NOT WANT IT IN YOUR FAXED NOTE.

## 2024-07-17 ENCOUNTER — APPOINTMENT (RX ONLY)
Dept: URBAN - METROPOLITAN AREA CLINIC 26 | Facility: CLINIC | Age: 69
Setting detail: DERMATOLOGY
End: 2024-07-17

## 2024-07-17 DIAGNOSIS — D18.0 HEMANGIOMA: ICD-10-CM

## 2024-07-17 DIAGNOSIS — D22 MELANOCYTIC NEVI: ICD-10-CM

## 2024-07-17 DIAGNOSIS — B07.8 OTHER VIRAL WARTS: ICD-10-CM

## 2024-07-17 DIAGNOSIS — L81.4 OTHER MELANIN HYPERPIGMENTATION: ICD-10-CM

## 2024-07-17 DIAGNOSIS — L82.1 OTHER SEBORRHEIC KERATOSIS: ICD-10-CM

## 2024-07-17 DIAGNOSIS — Z92.25 PERSONAL HISTORY OF IMMUNOSUPPRESSION THERAPY: ICD-10-CM

## 2024-07-17 PROBLEM — D18.01 HEMANGIOMA OF SKIN AND SUBCUTANEOUS TISSUE: Status: ACTIVE | Noted: 2024-07-17

## 2024-07-17 PROBLEM — D22.5 MELANOCYTIC NEVI OF TRUNK: Status: ACTIVE | Noted: 2024-07-17

## 2024-07-17 PROCEDURE — ? LIQUID NITROGEN

## 2024-07-17 PROCEDURE — 99213 OFFICE O/P EST LOW 20 MIN: CPT | Mod: 25

## 2024-07-17 PROCEDURE — 17110 DESTRUCTION B9 LES UP TO 14: CPT

## 2024-07-17 PROCEDURE — ? HIGH RISK MEDICATION MONITORING

## 2024-07-17 PROCEDURE — ? FULL BODY SKIN EXAM

## 2024-07-17 PROCEDURE — ? SUNSCREEN RECOMMENDATIONS

## 2024-07-17 PROCEDURE — ? COUNSELING

## 2024-07-17 ASSESSMENT — LOCATION ZONE DERM
LOCATION ZONE: LIP
LOCATION ZONE: TRUNK

## 2024-07-17 ASSESSMENT — LOCATION DETAILED DESCRIPTION DERM
LOCATION DETAILED: RIGHT INFERIOR VERMILION BORDER
LOCATION DETAILED: SUPERIOR THORACIC SPINE

## 2024-07-17 ASSESSMENT — LOCATION SIMPLE DESCRIPTION DERM
LOCATION SIMPLE: UPPER BACK
LOCATION SIMPLE: RIGHT LOWER LIP

## 2024-07-17 NOTE — PROCEDURE: HIGH RISK MEDICATION MONITORING
Erivedge Counseling- I discussed with the patient the risks of Erivedge including but not limited to nausea, vomiting, diarrhea, constipation, weight loss, changes in the sense of taste, decreased appetite, muscle spasms, and hair loss.  The patient verbalized understanding of the proper use and possible adverse effects of Erivedge.  All of the patient's questions and concerns were addressed.
Griseofulvin Pregnancy And Lactation Text: This medication is Pregnancy Category X and is known to cause serious birth defects. It is unknown if this medication is excreted in breast milk but breast feeding should be avoided.
Rifampin Pregnancy And Lactation Text: This medication is Pregnancy Category C and it isn't know if it is safe during pregnancy. It is also excreted in breast milk and should not be used if you are breast feeding.
Protopic Counseling: Patient may experience a mild burning sensation during topical application. Protopic is not approved in children less than 2 years of age. There have been case reports of hematologic and skin malignancies in patients using topical calcineurin inhibitors although causality is questionable.
Imiquimod Pregnancy And Lactation Text: This medication is Pregnancy Category C. It is unknown if this medication is excreted in breast milk.
Azathioprine Pregnancy And Lactation Text: This medication is Pregnancy Category D and isn't considered safe during pregnancy. It is unknown if this medication is excreted in breast milk.
Prednisone Counseling:  I discussed with the patient the risks of prolonged use of prednisone including but not limited to weight gain, insomnia, osteoporosis, mood changes, diabetes, susceptibility to infection, glaucoma and high blood pressure.  In cases where prednisone use is prolonged, patients should be monitored with blood pressure checks, serum glucose levels and an eye exam.  Additionally, the patient may need to be placed on GI prophylaxis, PCP prophylaxis, and calcium and vitamin D supplementation and/or a bisphosphonate.  The patient verbalized understanding of the proper use and the possible adverse effects of prednisone.  All of the patient's questions and concerns were addressed.
Doxepin Counseling:  Patient advised that the medication is sedating and not to drive a car after taking this medication. Patient informed of potential adverse effects including but not limited to dry mouth, urinary retention, and blurry vision.  The patient verbalized understanding of the proper use and possible adverse effects of doxepin.  All of the patient's questions and concerns were addressed.
Adbry Counseling: I discussed with the patient the risks of tralokinumab including but not limited to eye infection and irritation, cold sores, injection site reactions, worsening of asthma, allergic reactions and increased risk of parasitic infection.  Live vaccines should be avoided while taking tralokinumab. The patient understands that monitoring is required and they must alert us or the primary physician if symptoms of infection or other concerning signs are noted.
Dupixent Pregnancy And Lactation Text: This medication likely crosses the placenta but the risk for the fetus is uncertain. This medication is excreted in breast milk.
Bexarotene Pregnancy And Lactation Text: This medication is Pregnancy Category X and should not be given to women who are pregnant or may become pregnant. This medication should not be used if you are breast feeding.
Infliximab Counseling:  I discussed with the patient the risks of infliximab including but not limited to myelosuppression, immunosuppression, autoimmune hepatitis, demyelinating diseases, lymphoma, and serious infections.  The patient understands that monitoring is required including a PPD at baseline and must alert us or the primary physician if symptoms of infection or other concerning signs are noted.
SSKI Counseling:  I discussed with the patient the risks of SSKI including but not limited to thyroid abnormalities, metallic taste, GI upset, fever, headache, acne, arthralgias, paraesthesias, lymphadenopathy, easy bleeding, arrhythmias, and allergic reaction.
Tazorac Pregnancy And Lactation Text: This medication is not safe during pregnancy. It is unknown if this medication is excreted in breast milk.
Drysol Counseling:  I discussed with the patient the risks of drysol/aluminum chloride including but not limited to skin rash, itching, irritation, burning.
Dutasteride Pregnancy And Lactation Text: This medication is absolutely contraindicated in women, especially during pregnancy and breast feeding. Feminization of male fetuses is possible if taking while pregnant.
Winlevi Counseling:  I discussed with the patient the risks of topical clascoterone including but not limited to erythema, scaling, itching, and stinging. Patient voiced their understanding.
Clofazimine Counseling:  I discussed with the patient the risks of clofazimine including but not limited to skin and eye pigmentation, liver damage, nausea/vomiting, gastrointestinal bleeding and allergy.
Azelaic Acid Pregnancy And Lactation Text: This medication is considered safe during pregnancy and breast feeding.
Olanzapine Pregnancy And Lactation Text: This medication is pregnancy category C.   There are no adequate and well controlled trials with olanzapine in pregnant females.  Olanzapine should be used during pregnancy only if the potential benefit justifies the potential risk to the fetus.   In a study in lactating healthy women, olanzapine was excreted in breast milk.  It is recommended that women taking olanzapine should not breast feed.
Hydroxychloroquine Counseling:  I discussed with the patient that a baseline ophthalmologic exam is needed at the start of therapy and every year thereafter while on therapy. A CBC may also be warranted for monitoring.  The side effects of this medication were discussed with the patient, including but not limited to agranulocytosis, aplastic anemia, seizures, rashes, retinopathy, and liver toxicity. Patient instructed to call the office should any adverse effect occur.  The patient verbalized understanding of the proper use and possible adverse effects of Plaquenil.  All the patient's questions and concerns were addressed.
Stelara Counseling:  I discussed with the patient the risks of ustekinumab including but not limited to immunosuppression, malignancy, posterior leukoencephalopathy syndrome, and serious infections.  The patient understands that monitoring is required including a PPD at baseline and must alert us or the primary physician if symptoms of infection or other concerning signs are noted.
Use Enhanced Medication Counseling?: No
Rifampin Counseling: I discussed with the patient the risks of rifampin including but not limited to liver damage, kidney damage, red-orange body fluids, nausea/vomiting and severe allergy.
Rinvoq Counseling: I discussed with the patient the risks of Rinvoq therapy including but not limited to upper respiratory tract infections, shingles, cold sores, bronchitis, nausea, cough, fever, acne, and headache. Live vaccines should be avoided.  This medication has been linked to serious infections; higher rate of mortality; malignancy and lymphoproliferative disorders; major adverse cardiovascular events; thrombosis; thrombocytopenia, anemia, and neutropenia; lipid elevations; liver enzyme elevations; and gastrointestinal perforations.
Doxycycline Pregnancy And Lactation Text: This medication is Pregnancy Category D and not consider safe during pregnancy. It is also excreted in breast milk but is considered safe for shorter treatment courses.
Protopic Pregnancy And Lactation Text: This medication is Pregnancy Category C. It is unknown if this medication is excreted in breast milk when applied topically.
Methotrexate Pregnancy And Lactation Text: This medication is Pregnancy Category X and is known to cause fetal harm. This medication is excreted in breast milk.
Adbry Pregnancy And Lactation Text: It is unknown if this medication will adversely affect pregnancy or breast feeding.
Azathioprine Counseling:  I discussed with the patient the risks of azathioprine including but not limited to myelosuppression, immunosuppression, hepatotoxicity, lymphoma, and infections.  The patient understands that monitoring is required including baseline LFTs, Creatinine, possible TPMP genotyping and weekly CBCs for the first month and then every 2 weeks thereafter.  The patient verbalized understanding of the proper use and possible adverse effects of azathioprine.  All of the patient's questions and concerns were addressed.
Bactrim Counseling:  I discussed with the patient the risks of sulfa antibiotics including but not limited to GI upset, allergic reaction, drug rash, diarrhea, dizziness, photosensitivity, and yeast infections.  Rarely, more serious reactions can occur including but not limited to aplastic anemia, agranulocytosis, methemoglobinemia, blood dyscrasias, liver or kidney failure, lung infiltrates or desquamative/blistering drug rashes.
Enbrel Counseling:  I discussed with the patient the risks of etanercept including but not limited to myelosuppression, immunosuppression, autoimmune hepatitis, demyelinating diseases, lymphoma, and infections.  The patient understands that monitoring is required including a PPD at baseline and must alert us or the primary physician if symptoms of infection or other concerning signs are noted.
Bexarotene Counseling:  I discussed with the patient the risks of bexarotene including but not limited to hair loss, dry lips/skin/eyes, liver abnormalities, hyperlipidemia, pancreatitis, depression/suicidal ideation, photosensitivity, drug rash/allergic reactions, hypothyroidism, anemia, leukopenia, infection, cataracts, and teratogenicity.  Patient understands that they will need regular blood tests to check lipid profile, liver function tests, white blood cell count, thyroid function tests and pregnancy test if applicable.
Propranolol Pregnancy And Lactation Text: This medication is Pregnancy Category C and it isn't known if it is safe during pregnancy. It is excreted in breast milk.
Griseofulvin Counseling:  I discussed with the patient the risks of griseofulvin including but not limited to photosensitivity, cytopenia, liver damage, nausea/vomiting and severe allergy.  The patient understands that this medication is best absorbed when taken with a fatty meal (e.g., ice cream or french fries).
Imiquimod Counseling:  I discussed with the patient the risks of imiquimod including but not limited to erythema, scaling, itching, weeping, crusting, and pain.  Patient understands that the inflammatory response to imiquimod is variable from person to person and was educated regarded proper titration schedule.  If flu-like symptoms develop, patient knows to discontinue the medication and contact us.
Topical Clindamycin Counseling: Patient counseled that this medication may cause skin irritation or allergic reactions.  In the event of skin irritation, the patient was advised to reduce the amount of the drug applied or use it less frequently.   The patient verbalized understanding of the proper use and possible adverse effects of clindamycin.  All of the patient's questions and concerns were addressed.
5-Fu Pregnancy And Lactation Text: This medication is Pregnancy Category X and contraindicated in pregnancy and in women who may become pregnant. It is unknown if this medication is excreted in breast milk.
Doxepin Pregnancy And Lactation Text: This medication is Pregnancy Category C and it isn't known if it is safe during pregnancy. It is also excreted in breast milk and breast feeding isn't recommended.
Winlevi Pregnancy And Lactation Text: This medication is considered safe during pregnancy and breastfeeding.
Azelaic Acid Counseling: Patient counseled that medicine may cause skin irritation and to avoid applying near the eyes.  In the event of skin irritation, the patient was advised to reduce the amount of the drug applied or use it less frequently.   The patient verbalized understanding of the proper use and possible adverse effects of azelaic acid.  All of the patient's questions and concerns were addressed.
Skyrizi Pregnancy And Lactation Text: The risk during pregnancy and breastfeeding is uncertain with this medication.
Xolair Pregnancy And Lactation Text: This medication is Pregnancy Category B and is considered safe during pregnancy. This medication is excreted in breast milk.
Clofazimine Pregnancy And Lactation Text: This medication is Pregnancy Category C and isn't considered safe during pregnancy. It is excreted in breast milk.
Dutasteride Female Counseling: Dutasteride Counseling:  I discussed with the patient the risks of use of dutasteride including but not limited to decreased libido and sexual dysfunction. Explained the teratogenic nature of the medication and stressed the importance of not getting pregnant during treatment. All of the patient's questions and concerns were addressed.
Infliximab Pregnancy And Lactation Text: This medication is Pregnancy Category B and is considered safe during pregnancy. It is unknown if this medication is excreted in breast milk.
Olanzapine Counseling- I discussed with the patient the common side effects of olanzapine including but are not limited to: lack of energy, dry mouth, increased appetite, sleepiness, tremor, constipation, dizziness, changes in behavior, or restlessness.  Explained that teenagers are more likely to experience headaches, abdominal pain, pain in the arms or legs, tiredness, and sleepiness.  Serious side effects include but are not limited: increased risk of death in elderly patients who are confused, have memory loss, or dementia-related psychosis; hyperglycemia; increased cholesterol and triglycerides; and weight gain.
Glycopyrrolate Pregnancy And Lactation Text: This medication is Pregnancy Category B and is considered safe during pregnancy. It is unknown if it is excreted breast milk.
Detail Level: Detailed
Olumiant Pregnancy And Lactation Text: Based on animal studies, Olumiant may cause embryo-fetal harm when administered to pregnant women.  The medication should not be used in pregnancy.  Breastfeeding is not recommended during treatment.
Quinolones Pregnancy And Lactation Text: This medication is Pregnancy Category C and it isn't know if it is safe during pregnancy. It is also excreted in breast milk.
Doxycycline Counseling:  Patient counseled regarding possible photosensitivity and increased risk for sunburn.  Patient instructed to avoid sunlight, if possible.  When exposed to sunlight, patients should wear protective clothing, sunglasses, and sunscreen.  The patient was instructed to call the office immediately if the following severe adverse effects occur:  hearing changes, easy bruising/bleeding, severe headache, or vision changes.  The patient verbalized understanding of the proper use and possible adverse effects of doxycycline.  All of the patient's questions and concerns were addressed.
Azithromycin Pregnancy And Lactation Text: This medication is considered safe during pregnancy and is also secreted in breast milk.
Methotrexate Counseling:  Patient counseled regarding adverse effects of methotrexate including but not limited to nausea, vomiting, abnormalities in liver function tests. Patients may develop mouth sores, rash, diarrhea, and abnormalities in blood counts. The patient understands that monitoring is required including LFT's and blood counts.  There is a rare possibility of scarring of the liver and lung problems that can occur when taking methotrexate. Persistent nausea, loss of appetite, pale stools, dark urine, cough, and shortness of breath should be reported immediately. Patient advised to discontinue methotrexate treatment at least three months before attempting to become pregnant.  I discussed the need for folate supplements while taking methotrexate.  These supplements can decrease side effects during methotrexate treatment. The patient verbalized understanding of the proper use and possible adverse effects of methotrexate.  All of the patient's questions and concerns were addressed.
Rhofade Counseling: Rhofade is a topical medication which can decrease superficial blood flow where applied. Side effects are uncommon and include stinging, redness and allergic reactions.
Hydroquinone Pregnancy And Lactation Text: This medication has not been assigned a Pregnancy Risk Category but animal studies failed to show danger with the topical medication. It is unknown if the medication is excreted in breast milk.
Hydroxyzine Counseling: Patient advised that the medication is sedating and not to drive a car after taking this medication.  Patient informed of potential adverse effects including but not limited to dry mouth, urinary retention, and blurry vision.  The patient verbalized understanding of the proper use and possible adverse effects of hydroxyzine.  All of the patient's questions and concerns were addressed.
Zyclara Counseling:  I discussed with the patient the risks of imiquimod including but not limited to erythema, scaling, itching, weeping, crusting, and pain.  Patient understands that the inflammatory response to imiquimod is variable from person to person and was educated regarded proper titration schedule.  If flu-like symptoms develop, patient knows to discontinue the medication and contact us.
Bimzelx Counseling:  I discussed with the patient the risks of Bimzelx including but not limited to depression, immunosuppression, allergic reactions and infections.  The patient understands that monitoring is required including a PPD at baseline and must alert us or the primary physician if symptoms of infection or other concerning signs are noted.
Colchicine Counseling:  Patient counseled regarding adverse effects including but not limited to stomach upset (nausea, vomiting, stomach pain, or diarrhea).  Patient instructed to limit alcohol consumption while taking this medication.  Colchicine may reduce blood counts especially with prolonged use.  The patient understands that monitoring of kidney function and blood counts may be required, especially at baseline. The patient verbalized understanding of the proper use and possible adverse effects of colchicine.  All of the patient's questions and concerns were addressed.
Propranolol Counseling:  I discussed with the patient the risks of propranolol including but not limited to low heart rate, low blood pressure, low blood sugar, restlessness and increased cold sensitivity. They should call the office if they experience any of these side effects.
Valtrex Pregnancy And Lactation Text: this medication is Pregnancy Category B and is considered safe during pregnancy. This medication is not directly found in breast milk but it's metabolite acyclovir is present.
Acitretin Pregnancy And Lactation Text: This medication is Pregnancy Category X and should not be given to women who are pregnant or may become pregnant in the future. This medication is excreted in breast milk.
Glycopyrrolate Counseling:  I discussed with the patient the risks of glycopyrrolate including but not limited to skin rash, drowsiness, dry mouth, difficulty urinating, and blurred vision.
Rituxan Counseling:  I discussed with the patient the risks of Rituxan infusions. Side effects can include infusion reactions, severe drug rashes including mucocutaneous reactions, reactivation of latent hepatitis and other infections and rarely progressive multifocal leukoencephalopathy.  All of the patient's questions and concerns were addressed.
Nsaids Pregnancy And Lactation Text: These medications are considered safe up to 30 weeks gestation. It is excreted in breast milk.
Skyrizi Counseling: I discussed with the patient the risks of risankizumab-rzaa including but not limited to immunosuppression, and serious infections.  The patient understands that monitoring is required including a PPD at baseline and must alert us or the primary physician if symptoms of infection or other concerning signs are noted.
Xolair Counseling:  Patient informed of potential adverse effects including but not limited to fever, muscle aches, rash and allergic reactions.  The patient verbalized understanding of the proper use and possible adverse effects of Xolair.  All of the patient's questions and concerns were addressed.
Topical Clindamycin Pregnancy And Lactation Text: This medication is Pregnancy Category B and is considered safe during pregnancy. It is unknown if it is excreted in breast milk.
Spironolactone Pregnancy And Lactation Text: This medication can cause feminization of the male fetus and should be avoided during pregnancy. The active metabolite is also found in breast milk.
5-Fu Counseling: 5-Fluorouracil Counseling:  I discussed with the patient the risks of 5-fluorouracil including but not limited to erythema, scaling, itching, weeping, crusting, and pain.
Dutasteride Male Counseling: Dustasteride Counseling:  I discussed with the patient the risks of use of dutasteride including but not limited to decreased libido, decreased ejaculate volume, and gynecomastia. Women who can become pregnant should not handle medication.  All of the patient's questions and concerns were addressed.
Olumiant Counseling: I discussed with the patient the risks of Olumiant therapy including but not limited to upper respiratory tract infections, shingles, cold sores, and nausea. Live vaccines should be avoided.  This medication has been linked to serious infections; higher rate of mortality; malignancy and lymphoproliferative disorders; major adverse cardiovascular events; thrombosis; gastrointestinal perforations; neutropenia; lymphopenia; anemia; liver enzyme elevations; and lipid elevations.
Clindamycin Pregnancy And Lactation Text: This medication can be used in pregnancy if certain situations. Clindamycin is also present in breast milk.
Quinolones Counseling:  I discussed with the patient the risks of fluoroquinolones including but not limited to GI upset, allergic reaction, drug rash, diarrhea, dizziness, photosensitivity, yeast infections, liver function test abnormalities, tendonitis/tendon rupture.
High Dose Vitamin A Pregnancy And Lactation Text: High dose vitamin A therapy is contraindicated during pregnancy and breast feeding.
Rhofade Pregnancy And Lactation Text: This medication has not been assigned a Pregnancy Risk Category. It is unknown if the medication is excreted in breast milk.
Hydroquinone Counseling:  Patient advised that medication may result in skin irritation, lightening (hypopigmentation), dryness, and burning.  In the event of skin irritation, the patient was advised to reduce the amount of the drug applied or use it less frequently.  Rarely, spots that are treated with hydroquinone can become darker (pseudoochronosis).  Should this occur, patient instructed to stop medication and call the office. The patient verbalized understanding of the proper use and possible adverse effects of hydroquinone.  All of the patient's questions and concerns were addressed.
Hydroxyzine Pregnancy And Lactation Text: This medication is not safe during pregnancy and should not be taken. It is also excreted in breast milk and breast feeding isn't recommended.
Cyclosporine Pregnancy And Lactation Text: This medication is Pregnancy Category C and it isn't know if it is safe during pregnancy. This medication is excreted in breast milk.
Azithromycin Counseling:  I discussed with the patient the risks of azithromycin including but not limited to GI upset, allergic reaction, drug rash, diarrhea, and yeast infections.
Bimzelx Pregnancy And Lactation Text: This medication crosses the placenta and the safety is uncertain during pregnancy. It is unknown if this medication is present in breast milk.
Acitretin Counseling:  I discussed with the patient the risks of acitretin including but not limited to hair loss, dry lips/skin/eyes, liver damage, hyperlipidemia, depression/suicidal ideation, photosensitivity.  Serious rare side effects can include but are not limited to pancreatitis, pseudotumor cerebri, bony changes, clot formation/stroke/heart attack.  Patient understands that alcohol is contraindicated since it can result in liver toxicity and significantly prolong the elimination of the drug by many years.
Valtrex Counseling: I discussed with the patient the risks of valacyclovir including but not limited to kidney damage, nausea, vomiting and severe allergy.  The patient understands that if the infection seems to be worsening or is not improving, they are to call.
Fluconazole Counseling:  Patient counseled regarding adverse effects of fluconazole including but not limited to headache, diarrhea, nausea, upset stomach, liver function test abnormalities, taste disturbance, and stomach pain.  There is a rare possibility of liver failure that can occur when taking fluconazole.  The patient understands that monitoring of LFTs and kidney function test may be required, especially at baseline. The patient verbalized understanding of the proper use and possible adverse effects of fluconazole.  All of the patient's questions and concerns were addressed.
Topical Ketoconazole Counseling: Patient counseled that this medication may cause skin irritation or allergic reactions.  In the event of skin irritation, the patient was advised to reduce the amount of the drug applied or use it less frequently.   The patient verbalized understanding of the proper use and possible adverse effects of ketoconazole.  All of the patient's questions and concerns were addressed.
Calcipotriene Pregnancy And Lactation Text: This medication has not been proven safe during pregnancy. It is unknown if this medication is excreted in breast milk.
Humira Counseling:  I discussed with the patient the risks of adalimumab including but not limited to myelosuppression, immunosuppression, autoimmune hepatitis, demyelinating diseases, lymphoma, and serious infections.  The patient understands that monitoring is required including a PPD at baseline and must alert us or the primary physician if symptoms of infection or other concerning signs are noted.
Nsaids Counseling: NSAID Counseling: I discussed with the patient that NSAIDs should be taken with food. Prolonged use of NSAIDs can result in the development of stomach ulcers.  Patient advised to stop taking NSAIDs if abdominal pain occurs.  The patient verbalized understanding of the proper use and possible adverse effects of NSAIDs.  All of the patient's questions and concerns were addressed.
Opzelura Counseling:  I discussed with the patient the risks of Opzelura including but not limited to nasopharngitis, bronchitis, ear infection, eosinophila, hives, diarrhea, folliculitis, tonsillitis, and rhinorrhea.  Taken orally, this medication has been linked to serious infections; higher rate of mortality; malignancy and lymphoproliferative disorders; major adverse cardiovascular events; thrombosis; thrombocytopenia, anemia, and neutropenia; and lipid elevations.
Spironolactone Counseling: Patient advised regarding risks of diarrhea, abdominal pain, hyperkalemia, birth defects (for female patients), liver toxicity and renal toxicity. The patient may need blood work to monitor liver and kidney function and potassium levels while on therapy. The patient verbalized understanding of the proper use and possible adverse effects of spironolactone.  All of the patient's questions and concerns were addressed.
Litfulo Pregnancy And Lactation Text: There is insufficient data to evaluate whether or not Litfulo is safe to use during pregnancy.  Breastfeeding is not recommended during treatment.
Minocycline Pregnancy And Lactation Text: This medication is Pregnancy Category D and not consider safe during pregnancy. It is also excreted in breast milk.
Clindamycin Counseling: I counseled the patient regarding use of clindamycin as an antibiotic for prophylactic and/or therapeutic purposes. Clindamycin is active against numerous classes of bacteria, including skin bacteria. Side effects may include nausea, diarrhea, gastrointestinal upset, rash, hives, yeast infections, and in rare cases, colitis.
Odomzo Pregnancy And Lactation Text: This medication is Pregnancy Category X and is absolutely contraindicated during pregnancy. It is unknown if it is excreted in breast milk.
Solaraze Counseling:  I discussed with the patient the risks of Solaraze including but not limited to erythema, scaling, itching, weeping, crusting, and pain.
Cyclosporine Counseling:  I discussed with the patient the risks of cyclosporine including but not limited to hypertension, gingival hyperplasia,myelosuppression, immunosuppression, liver damage, kidney damage, neurotoxicity, lymphoma, and serious infections. The patient understands that monitoring is required including baseline blood pressure, CBC, CMP, lipid panel and uric acid, and then 1-2 times monthly CMP and blood pressure.
Tranexamic Acid Pregnancy And Lactation Text: It is unknown if this medication is safe during pregnancy or breast feeding.
Cimzia Counseling:  I discussed with the patient the risks of Cimzia including but not limited to immunosuppression, allergic reactions and infections.  The patient understands that monitoring is required including a PPD at baseline and must alert us or the primary physician if symptoms of infection or other concerning signs are noted.
Oxybutynin Counseling:  I discussed with the patient the risks of oxybutynin including but not limited to skin rash, drowsiness, dry mouth, difficulty urinating, and blurred vision.
Calcipotriene Counseling:  I discussed with the patient the risks of calcipotriene including but not limited to erythema, scaling, itching, and irritation.
Dapsone Counseling: I discussed with the patient the risks of dapsone including but not limited to hemolytic anemia, agranulocytosis, rashes, methemoglobinemia, kidney failure, peripheral neuropathy, headaches, GI upset, and liver toxicity.  Patients who start dapsone require monitoring including baseline LFTs and weekly CBCs for the first month, then every month thereafter.  The patient verbalized understanding of the proper use and possible adverse effects of dapsone.  All of the patient's questions and concerns were addressed.
Niacinamide Pregnancy And Lactation Text: These medications are considered safe during pregnancy.
Tremfya Counseling: I discussed with the patient the risks of guselkumab including but not limited to immunosuppression, serious infections, and drug reactions.  The patient understands that monitoring is required including a PPD at baseline and must alert us or the primary physician if symptoms of infection or other concerning signs are noted.
Simponi Counseling:  I discussed with the patient the risks of golimumab including but not limited to myelosuppression, immunosuppression, autoimmune hepatitis, demyelinating diseases, lymphoma, and serious infections.  The patient understands that monitoring is required including a PPD at baseline and must alert us or the primary physician if symptoms of infection or other concerning signs are noted.
Birth Control Pills Pregnancy And Lactation Text: This medication should be avoided if pregnant and for the first 30 days post-partum.
Xelshiraz Pregnancy And Lactation Text: This medication is Pregnancy Category D and is not considered safe during pregnancy.  The risk during breast feeding is also uncertain.
Ketoconazole Pregnancy And Lactation Text: This medication is Pregnancy Category C and it isn't know if it is safe during pregnancy. It is also excreted in breast milk and breast feeding isn't recommended.
Litfulo Counseling: Litfulo (Ritlecitinib) Counseling: I discussed with the patient the risks of Litfulo therapy including but not limited to headache, upper respiratory infections, nausea, diarrhea, acne, and urticaria. Live vaccines should be avoided.  This medication has been linked to serious infections; higher rate of mortality; malignancy and lymphoproliferative disorders; major adverse cardiovascular events; thrombosis; decreases in lymphocytes and platelets; liver enzyme elevations; and CPK elevations.
Terbinafine Counseling: Patient counseling regarding adverse effects of terbinafine including but not limited to headache, diarrhea, rash, upset stomach, liver function test abnormalities, itching, taste/smell disturbance, nausea, abdominal pain, and flatulence.  There is a rare possibility of liver failure that can occur when taking terbinafine.  The patient understands that a baseline LFT and kidney function test may be required. The patient verbalized understanding of the proper use and possible adverse effects of terbinafine.  All of the patient's questions and concerns were addressed.
Minocycline Counseling: Patient advised regarding possible photosensitivity and discoloration of the teeth, skin, lips, tongue and gums.  Patient instructed to avoid sunlight, if possible.  When exposed to sunlight, patients should wear protective clothing, sunglasses, and sunscreen.  The patient was instructed to call the office immediately if the following severe adverse effects occur:  hearing changes, easy bruising/bleeding, severe headache, or vision changes.  The patient verbalized understanding of the proper use and possible adverse effects of minocycline.  All of the patient's questions and concerns were addressed.
Albendazole Counseling:  I discussed with the patient the risks of albendazole including but not limited to cytopenia, kidney damage, nausea/vomiting and severe allergy.  The patient understands that this medication is being used in an off-label manner.
Opioid Counseling: I discussed with the patient the potential side effects of opioids including but not limited to addiction, altered mental status, and depression. I stressed avoiding alcohol, benzodiazepines, muscle relaxants and sleep aids unless specifically okayed by a physician. The patient verbalized understanding of the proper use and possible adverse effects of opioids. All of the patient's questions and concerns were addressed. They were instructed to flush the remaining pills down the toilet if they did not need them for pain.
Cephalexin Pregnancy And Lactation Text: This medication is Pregnancy Category B and considered safe during pregnancy.  It is also excreted in breast milk but can be used safely for shorter doses.
Odomzo Counseling- I discussed with the patient the risks of Odomzo including but not limited to nausea, vomiting, diarrhea, constipation, weight loss, changes in the sense of taste, decreased appetite, muscle spasms, and hair loss.  The patient verbalized understanding of the proper use and possible adverse effects of Odomzo.  All of the patient's questions and concerns were addressed.
Cyclophosphamide Pregnancy And Lactation Text: This medication is Pregnancy Category D and it isn't considered safe during pregnancy. This medication is excreted in breast milk.
Cimzia Pregnancy And Lactation Text: This medication crosses the placenta but can be considered safe in certain situations. Cimzia may be excreted in breast milk.
Eucrisa Counseling: Patient may experience a mild burning sensation during topical application. Eucrisa is not approved in children less than 3 months of age.
Tranexamic Acid Counseling:  Patient advised of the small risk of bleeding problems with tranexamic acid. They were also instructed to call if they developed any nausea, vomiting or diarrhea. All of the patient's questions and concerns were addressed.
Dapsone Pregnancy And Lactation Text: This medication is Pregnancy Category C and is not considered safe during pregnancy or breast feeding.
Otezla Pregnancy And Lactation Text: This medication is Pregnancy Category C and it isn't known if it is safe during pregnancy. It is unknown if it is excreted in breast milk.
Hyrimoz Counseling:  I discussed with the patient the risks of adalimumab including but not limited to myelosuppression, immunosuppression, autoimmune hepatitis, demyelinating diseases, lymphoma, and serious infections.  The patient understands that monitoring is required including a PPD at baseline and must alert us or the primary physician if symptoms of infection or other concerning signs are noted.
Solaraze Pregnancy And Lactation Text: This medication is Pregnancy Category B and is considered safe. There is some data to suggest avoiding during the third trimester. It is unknown if this medication is excreted in breast milk.
Birth Control Pills Counseling: Birth Control Pill Counseling: I discussed with the patient the potential side effects of OCPs including but not limited to increased risk of stroke, heart attack, thrombophlebitis, deep venous thrombosis, hepatic adenomas, breast changes, GI upset, headaches, and depression.  The patient verbalized understanding of the proper use and possible adverse effects of OCPs. All of the patient's questions and concerns were addressed.
Topical Sulfur Applications Counseling: Topical Sulfur Counseling: Patient counseled that this medication may cause skin irritation or allergic reactions.  In the event of skin irritation, the patient was advised to reduce the amount of the drug applied or use it less frequently.   The patient verbalized understanding of the proper use and possible adverse effects of topical sulfur application.  All of the patient's questions and concerns were addressed.
Cibinqo Pregnancy And Lactation Text: It is unknown if this medication will adversely affect pregnancy or breast feeding.  You should not take this medication if you are currently pregnant or planning a pregnancy or while breastfeeding.
Niacinamide Counseling: I recommended taking niacin or niacinamide, also know as vitamin B3, twice daily. Recent evidence suggests that taking vitamin B3 (500 mg twice daily) can reduce the risk of actinic keratoses and non-melanoma skin cancers. Side effects of vitamin B3 include flushing and headache.
Cephalexin Counseling: I counseled the patient regarding use of cephalexin as an antibiotic for prophylactic and/or therapeutic purposes. Cephalexin (commonly prescribed under brand name Keflex) is a cephalosporin antibiotic which is active against numerous classes of bacteria, including most skin bacteria. Side effects may include nausea, diarrhea, gastrointestinal upset, rash, hives, yeast infections, and in rare cases, hepatitis, kidney disease, seizures, fever, confusion, neurologic symptoms, and others. Patients with severe allergies to penicillin medications are cautioned that there is about a 10% incidence of cross-reactivity with cephalosporins. When possible, patients with penicillin allergies should use alternatives to cephalosporins for antibiotic therapy.
Albendazole Pregnancy And Lactation Text: This medication is Pregnancy Category C and it isn't known if it is safe during pregnancy. It is also excreted in breast milk.
Opioid Pregnancy And Lactation Text: These medications can lead to premature delivery and should be avoided during pregnancy. These medications are also present in breast milk in small amounts.
Xeljanz Counseling: I discussed with the patient the risks of Xeljanz therapy including increased risk of infection, liver issues, headache, diarrhea, or cold symptoms. Live vaccines should be avoided. They were instructed to call if they have any problems.
Ketoconazole Counseling:   Patient counseled regarding improving absorption with orange juice.  Adverse effects include but are not limited to breast enlargement, headache, diarrhea, nausea, upset stomach, liver function test abnormalities, taste disturbance, and stomach pain.  There is a rare possibility of liver failure that can occur when taking ketoconazole. The patient understands that monitoring of LFTs may be required, especially at baseline. The patient verbalized understanding of the proper use and possible adverse effects of ketoconazole.  All of the patient's questions and concerns were addressed.
Tetracycline Counseling: Patient counseled regarding possible photosensitivity and increased risk for sunburn.  Patient instructed to avoid sunlight, if possible.  When exposed to sunlight, patients should wear protective clothing, sunglasses, and sunscreen.  The patient was instructed to call the office immediately if the following severe adverse effects occur:  hearing changes, easy bruising/bleeding, severe headache, or vision changes.  The patient verbalized understanding of the proper use and possible adverse effects of tetracycline.  All of the patient's questions and concerns were addressed. Patient understands to avoid pregnancy while on therapy due to potential birth defects.
Opzelura Pregnancy And Lactation Text: There is insufficient data to evaluate drug-associated risk for major birth defects, miscarriage, or other adverse maternal or fetal outcomes.  There is a pregnancy registry that monitors pregnancy outcomes in pregnant persons exposed to the medication during pregnancy.  It is unknown if this medication is excreted in breast milk.  Do not breastfeed during treatment and for about 4 weeks after the last dose.
Mirvaso Counseling: Mirvaso is a topical medication which can decrease superficial blood flow where applied. Side effects are uncommon and include stinging, redness and allergic reactions.
Metronidazole Pregnancy And Lactation Text: This medication is Pregnancy Category B and considered safe during pregnancy.  It is also excreted in breast milk.
Terbinafine Pregnancy And Lactation Text: This medication is Pregnancy Category B and is considered safe during pregnancy. It is also excreted in breast milk and breast feeding isn't recommended.
Libtayo Pregnancy And Lactation Text: This medication is contraindicated in pregnancy and when breast feeding.
Cyclophosphamide Counseling:  I discussed with the patient the risks of cyclophosphamide including but not limited to hair loss, hormonal abnormalities, decreased fertility, abdominal pain, diarrhea, nausea and vomiting, bone marrow suppression and infection. The patient understands that monitoring is required while taking this medication.
Cosentyx Counseling:  I discussed with the patient the risks of Cosentyx including but not limited to worsening of Crohn's disease, immunosuppression, allergic reactions and infections.  The patient understands that monitoring is required including a PPD at baseline and must alert us or the primary physician if symptoms of infection or other concerning signs are noted.
Gabapentin Counseling: I discussed with the patient the risks of gabapentin including but not limited to dizziness, somnolence, fatigue and ataxia.
Taltz Counseling: I discussed with the patient the risks of ixekizumab including but not limited to immunosuppression, serious infections, worsening of inflammatory bowel disease and drug reactions.  The patient understands that monitoring is required including a PPD at baseline and must alert us or the primary physician if symptoms of infection or other concerning signs are noted.
Otezla Counseling: The side effects of Otezla were discussed with the patient, including but not limited to worsening or new depression, weight loss, diarrhea, nausea, upper respiratory tract infection, and headache. Patient instructed to call the office should any adverse effect occur.  The patient verbalized understanding of the proper use and possible adverse effects of Otezla.  All the patient's questions and concerns were addressed.
High Dose Vitamin A Counseling: Side effects reviewed, pt to contact office should one occur.
Topical Retinoid counseling:  Patient advised to apply a pea-sized amount only at bedtime and wait 30 minutes after washing their face before applying.  If too drying, patient may add a non-comedogenic moisturizer. The patient verbalized understanding of the proper use and possible adverse effects of retinoids.  All of the patient's questions and concerns were addressed.
Topical Sulfur Applications Pregnancy And Lactation Text: This medication is considered safe during pregnancy and breast feeding secondary to limited systemic absorption.
Carac Counseling:  I discussed with the patient the risks of Carac including but not limited to erythema, scaling, itching, weeping, crusting, and pain.
Finasteride Pregnancy And Lactation Text: This medication is absolutely contraindicated during pregnancy. It is unknown if it is excreted in breast milk.
Cibinqo Counseling: I discussed with the patient the risks of Cibinqo therapy including but not limited to common cold, nausea, headache, cold sores, increased blood CPK levels, dizziness, UTIs, fatigue, acne, and vomitting. Live vaccines should be avoided.  This medication has been linked to serious infections; higher rate of mortality; malignancy and lymphoproliferative disorders; major adverse cardiovascular events; thrombosis; thrombocytopenia and lymphopenia; lipid elevations; and retinal detachment.
Siliq Counseling:  I discussed with the patient the risks of Siliq including but not limited to new or worsening depression, suicidal thoughts and behavior, immunosuppression, malignancy, posterior leukoencephalopathy syndrome, and serious infections.  The patient understands that monitoring is required including a PPD at baseline and must alert us or the primary physician if symptoms of infection or other concerning signs are noted. There is also a special program designed to monitor depression which is required with Siliq.
Low Dose Naltrexone Pregnancy And Lactation Text: Naltrexone is pregnancy category C.  There have been no adequate and well-controlled studies in pregnant women.  It should be used in pregnancy only if the potential benefit justifies the potential risk to the fetus.   Limited data indicates that naltrexone is minimally excreted into breastmilk.
Ivermectin Counseling:  Patient instructed to take medication on an empty stomach with a full glass of water.  Patient informed of potential adverse effects including but not limited to nausea, diarrhea, dizziness, itching, and swelling of the extremities or lymph nodes.  The patient verbalized understanding of the proper use and possible adverse effects of ivermectin.  All of the patient's questions and concerns were addressed.
Sotyktu Pregnancy And Lactation Text: There is insufficient data to evaluate whether or not Sotyktu is safe to use during pregnancy.   It is not known if Sotyktu passes into breast milk and whether or not it is safe to use when breastfeeding.  
Metronidazole Counseling:  I discussed with the patient the risks of metronidazole including but not limited to seizures, nausea/vomiting, a metallic taste in the mouth, nausea/vomiting and severe allergy.
Bactrim Pregnancy And Lactation Text: This medication is Pregnancy Category D and is known to cause fetal risk.  It is also excreted in breast milk.
Libtayo Counseling- I discussed with the patient the risks of Libtayo including but not limited to nausea, vomiting, diarrhea, and bone or muscle pain.  The patient verbalized understanding of the proper use and possible adverse effects of Libtayo.  All of the patient's questions and concerns were addressed.
Picato Counseling:  I discussed with the patient the risks of Picato including but not limited to erythema, scaling, itching, weeping, crusting, and pain.
Thalidomide Counseling: I discussed with the patient the risks of thalidomide including but not limited to birth defects, anxiety, weakness, chest pain, dizziness, cough and severe allergy.
Oral Minoxidil Pregnancy And Lactation Text: This medication should only be used when clearly needed if you are pregnant, attempting to become pregnant or breast feeding.
Cimetidine Counseling:  I discussed with the patient the risks of Cimetidine including but not limited to gynecomastia, headache, diarrhea, nausea, drowsiness, arrhythmias, pancreatitis, skin rashes, psychosis, bone marrow suppression and kidney toxicity.
Elidel Counseling: Patient may experience a mild burning sensation during topical application. Elidel is not approved in children less than 2 years of age. There have been case reports of hematologic and skin malignancies in patients using topical calcineurin inhibitors although causality is questionable.
Benzoyl Peroxide Pregnancy And Lactation Text: This medication is Pregnancy Category C. It is unknown if benzoyl peroxide is excreted in breast milk.
Wartpeel Counseling:  I discussed with the patient the risks of Wartpeel including but not limited to erythema, scaling, itching, weeping, crusting, and pain.
Rituxan Pregnancy And Lactation Text: This medication is Pregnancy Category C and it isn't know if it is safe during pregnancy. It is unknown if this medication is excreted in breast milk but similar antibodies are known to be excreted.
Arava Counseling:  Patient counseled regarding adverse effects of Arava including but not limited to nausea, vomiting, abnormalities in liver function tests. Patients may develop mouth sores, rash, diarrhea, and abnormalities in blood counts. The patient understands that monitoring is required including LFTs and blood counts.  There is a rare possibility of scarring of the liver and lung problems that can occur when taking methotrexate. Persistent nausea, loss of appetite, pale stools, dark urine, cough, and shortness of breath should be reported immediately. Patient advised to discontinue Arava treatment and consult with a physician prior to attempting conception. The patient will have to undergo a treatment to eliminate Arava from the body prior to conception.
Isotretinoin Pregnancy And Lactation Text: This medication is Pregnancy Category X and is considered extremely dangerous during pregnancy. It is unknown if it is excreted in breast milk.
Low Dose Naltrexone Counseling- I discussed with the patient the potential risks and side effects of low dose naltrexone including but not limited to: more vivid dreams, headaches, nausea, vomiting, abdominal pain, fatigue, dizziness, and anxiety.
Ilumya Counseling: I discussed with the patient the risks of tildrakizumab including but not limited to immunosuppression, malignancy, posterior leukoencephalopathy syndrome, and serious infections.  The patient understands that monitoring is required including a PPD at baseline and must alert us or the primary physician if symptoms of infection or other concerning signs are noted.
Finasteride Female Counseling: Finasteride Counseling:  I discussed with the patient the risks of use of finasteride including but not limited to decreased libido and sexual dysfunction. Explained the teratogenic nature of the medication and stressed the importance of not getting pregnant during treatment. All of the patient's questions and concerns were addressed.
Sotyktu Counseling:  I discussed the most common side effects of Sotyktu including: common cold, sore throat, sinus infections, cold sores, canker sores, folliculitis, and acne.  I also discussed more serious side effects of Sotyktu including but not limited to: serious allergic reactions; increased risk for infections such as TB; cancers such as lymphomas; rhabdomyolysis and elevated CPK; and elevated triglycerides and liver enzymes. 
Sarecycline Counseling: Patient advised regarding possible photosensitivity and discoloration of the teeth, skin, lips, tongue and gums.  Patient instructed to avoid sunlight, if possible.  When exposed to sunlight, patients should wear protective clothing, sunglasses, and sunscreen.  The patient was instructed to call the office immediately if the following severe adverse effects occur:  hearing changes, easy bruising/bleeding, severe headache, or vision changes.  The patient verbalized understanding of the proper use and possible adverse effects of sarecycline.  All of the patient's questions and concerns were addressed.
Erythromycin Pregnancy And Lactation Text: This medication is Pregnancy Category B and is considered safe during pregnancy. It is also excreted in breast milk.
Itraconazole Counseling:  I discussed with the patient the risks of itraconazole including but not limited to liver damage, nausea/vomiting, neuropathy, and severe allergy.  The patient understands that this medication is best absorbed when taken with acidic beverages such as non-diet cola or ginger ale.  The patient understands that monitoring is required including baseline LFTs and repeat LFTs at intervals.  The patient understands that they are to contact us or the primary physician if concerning signs are noted.
Minoxidil Counseling: Minoxidil is a topical medication which can increase blood flow where it is applied. It is uncertain how this medication increases hair growth. Side effects are uncommon and include stinging and allergic reactions.
Cellcept Counseling:  I discussed with the patient the risks of mycophenolate mofetil including but not limited to infection/immunosuppression, GI upset, hypokalemia, hypercholesterolemia, bone marrow suppression, lymphoproliferative disorders, malignancy, GI ulceration/bleed/perforation, colitis, interstitial lung disease, kidney failure, progressive multifocal leukoencephalopathy, and birth defects.  The patient understands that monitoring is required including a baseline creatinine and regular CBC testing. In addition, patient must alert us immediately if symptoms of infection or other concerning signs are noted.
Dupixent Counseling: I discussed with the patient the risks of dupilumab including but not limited to eye infection and irritation, cold sores, injection site reactions, worsening of asthma, allergic reactions and increased risk of parasitic infection.  Live vaccines should be avoided while taking dupilumab. Dupilumab will also interact with certain medications such as warfarin and cyclosporine. The patient understands that monitoring is required and they must alert us or the primary physician if symptoms of infection or other concerning signs are noted.
Oral Minoxidil Counseling- I discussed with the patient the risks of oral minoxidil including but not limited to shortness of breath, swelling of the feet or ankles, dizziness, lightheadedness, unwanted hair growth and allergic reaction.  The patient verbalized understanding of the proper use and possible adverse effects of oral minoxidil.  All of the patient's questions and concerns were addressed.
Sski Pregnancy And Lactation Text: This medication is Pregnancy Category D and isn't considered safe during pregnancy. It is excreted in breast milk.
Benzoyl Peroxide Counseling: Patient counseled that medicine may cause skin irritation and bleach clothing.  In the event of skin irritation, the patient was advised to reduce the amount of the drug applied or use it less frequently.   The patient verbalized understanding of the proper use and possible adverse effects of benzoyl peroxide.  All of the patient's questions and concerns were addressed.
Isotretinoin Counseling: Patient should get monthly blood tests, not donate blood, not drive at night if vision affected, not share medication, and not undergo elective surgery for 6 months after tx completed. Side effects reviewed, pt to contact office should one occur.
Hydroxychloroquine Pregnancy And Lactation Text: This medication has been shown to cause fetal harm but it isn't assigned a Pregnancy Risk Category. There are small amounts excreted in breast milk.
Tazorac Counseling:  Patient advised that medication is irritating and drying.  Patient may need to apply sparingly and wash off after an hour before eventually leaving it on overnight.  The patient verbalized understanding of the proper use and possible adverse effects of tazorac.  All of the patient's questions and concerns were addressed.
Finasteride Male Counseling: Finasteride Counseling:  I discussed with the patient the risks of use of finasteride including but not limited to decreased libido, decreased ejaculate volume, gynecomastia, and depression. Women should not handle medication.  All of the patient's questions and concerns were addressed.
Rinvoq Pregnancy And Lactation Text: Based on animal studies, Rinvoq may cause embryo-fetal harm when administered to pregnant women.  The medication should not be used in pregnancy.  Breastfeeding is not recommended during treatment and for 6 days after the last dose.
Erythromycin Counseling:  I discussed with the patient the risks of erythromycin including but not limited to GI upset, allergic reaction, drug rash, diarrhea, increase in liver enzymes, and yeast infections.

## 2024-07-17 NOTE — PROCEDURE: LIQUID NITROGEN
Show Topical Anesthesia Variable?: Yes
Detail Level: Zone
Spray Paint Technique: No
Post-Care Instructions: I reviewed with the patient in detail post-care instructions. Patient is to wear sunprotection, and avoid picking at any of the treated lesions. Pt may apply Vaseline to crusted or scabbing areas.
Spray Paint Text: The liquid nitrogen was applied to the skin utilizing a spray paint frosting technique.
Medical Necessity Clause: This procedure was medically necessary because the lesions that were treated were: enlarging, inflamed, irritated, rubbing on clothing.
Consent: The patient's consent was obtained including but not limited to risks of crusting, scabbing, blistering, scarring, darker or lighter pigmentary change, recurrence, incomplete removal and infection.
Medical Necessity Information: It is in your best interest to select a reason for this procedure from the list below. All of these items fulfill various CMS LCD requirements except the new and changing color options.

## 2025-04-10 ENCOUNTER — HOSPITAL ENCOUNTER (EMERGENCY)
Dept: HOSPITAL 99 - EMR | Age: 70
LOS: 1 days | Discharge: HOME | End: 2025-04-11
Payer: COMMERCIAL

## 2025-04-10 VITALS — DIASTOLIC BLOOD PRESSURE: 97 MMHG | SYSTOLIC BLOOD PRESSURE: 166 MMHG

## 2025-04-10 VITALS — SYSTOLIC BLOOD PRESSURE: 166 MMHG | DIASTOLIC BLOOD PRESSURE: 86 MMHG

## 2025-04-10 DIAGNOSIS — S61.311A: Primary | ICD-10-CM
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
## 2025-04-10 DIAGNOSIS — Z94.0: ICD-10-CM

## 2025-04-10 DIAGNOSIS — S61.313A: ICD-10-CM

## 2025-04-10 PROCEDURE — 99283 EMERGENCY DEPT VISIT LOW MDM: CPT

## 2025-04-10 PROCEDURE — 12001 RPR S/N/AX/GEN/TRNK 2.5CM/<: CPT

## 2025-04-11 VITALS — DIASTOLIC BLOOD PRESSURE: 88 MMHG | SYSTOLIC BLOOD PRESSURE: 154 MMHG

## 2025-04-11 RX ADMIN — CEPHALEXIN 500 MG: 500 CAPSULE ORAL at 00:00

## 2025-04-11 RX ADMIN — OXYCODONE AND ACETAMINOPHEN 1 TABLET: 325; 5 TABLET ORAL at 00:18

## (undated) DEVICE — PAD GROUND ELECTROSURGICAL W/CORD

## (undated) DEVICE — NEEDLE DISP HYPO 21G X1 1/2 IN

## (undated) DEVICE — TUBING SMOKE EVAC PENCIL COATED

## (undated) DEVICE — NEEDLE SAFE BLUNT 18G

## (undated) DEVICE — HOOD FLYTE SURGICOOL

## (undated) DEVICE — BLANKET UPPER BODY

## (undated) DEVICE — GLOVE SZ 8.5 PROTEXIS PI

## (undated) DEVICE — SET HANDPIECE INTERPULSE

## (undated) DEVICE — PREP ALCOHOL PAD LARGE

## (undated) DEVICE — ***USE 57698*** SLEEVE FLOWTRON DVT CALF SINGLE USE

## (undated) DEVICE — SUTURE STRATAFIX 1 60CM PDS

## (undated) DEVICE — VEST STERILE

## (undated) DEVICE — BANDAGE ESMARK 6X12 LATEX FREE

## (undated) DEVICE — APPLICATOR CHLORAPREP 26ML ORANGE TINT

## (undated) DEVICE — SUCTION 18FR FRAZIER DISPOSABLE

## (undated) DEVICE — ADHESIVE SKIN DERMABOND ADVANCED 0.7ML

## (undated) DEVICE — BANDAID FLEXIBLE FABRIC 1IN 50/BX LATEX FREE

## (undated) DEVICE — DRESSING MEPILEX 4X10 BORDER

## (undated) DEVICE — SYRINGE ONLY  LUER SLIP TIP 30ML

## (undated) DEVICE — SOLN IRRIG STER WATER 1000ML

## (undated) DEVICE — Device

## (undated) DEVICE — GLOVE SZ 8.5 LINER PROTEXIS PI BL

## (undated) DEVICE — MANIFOLD FOUR PORT NEPTUNE

## (undated) DEVICE — GOWN SURGICAL REINFORCED X-LAR

## (undated) DEVICE — GLOVE SZ 8.5 PROTEXIS CLASSIC LATEX

## (undated) DEVICE — BLADE SAGITTAL #127 STABLECUT

## (undated) DEVICE — SUTURE MONOCRYL 3-0  Y497G

## (undated) DEVICE — BLADE SAGITTAL #152 STRYKER NARROW THICK NO OFFSET 12.5MM

## (undated) DEVICE — SUTURE VICRYL 2-0  J596H

## (undated) DEVICE — COVER LIGHTHANDLE

## (undated) DEVICE — BLADE SCALPEL #10

## (undated) DEVICE — DRAPE-U NON-STERILE

## (undated) DEVICE — SOLN IRRIG .9%SOD 3L